# Patient Record
Sex: MALE | Race: WHITE | NOT HISPANIC OR LATINO | Employment: OTHER | URBAN - METROPOLITAN AREA
[De-identification: names, ages, dates, MRNs, and addresses within clinical notes are randomized per-mention and may not be internally consistent; named-entity substitution may affect disease eponyms.]

---

## 2017-05-18 ENCOUNTER — HOSPITAL ENCOUNTER (OUTPATIENT)
Dept: FAMILY MEDICINE CLINIC | Facility: CLINIC | Age: 62
Setting detail: SPECIMEN
Discharge: HOME | End: 2017-05-18
Attending: FAMILY MEDICINE | Admitting: FAMILY MEDICINE

## 2017-05-18 LAB
ALBUMIN SERPL-MCNC: 4.3 G/DL (ref 3.5–4.8)
ALBUMIN/GLOB SERPL: 1.7 {RATIO} (ref 1–1.7)
ALP SERPL-CCNC: 48 IU/L (ref 32–91)
ALT SERPL-CCNC: 29 IU/L (ref 17–63)
ANION GAP SERPL CALC-SCNC: 12.2 MMOL/L (ref 10–20)
AST SERPL-CCNC: 26 IU/L (ref 15–41)
BILIRUB SERPL-MCNC: 0.5 MG/DL (ref 0.3–1.2)
BUN SERPL-MCNC: 12 MG/DL (ref 8–20)
BUN/CREAT SERPL: 10.9 (ref 6.2–20.3)
CALCIUM SERPL-MCNC: 9.5 MG/DL (ref 8.9–10.3)
CHLORIDE SERPL-SCNC: 105 MMOL/L (ref 101–111)
CHOLEST SERPL-MCNC: 149 MG/DL
CHOLEST/HDLC SERPL: 5.2 {RATIO}
CONV CO2: 25 MMOL/L (ref 22–32)
CONV LDL CHOLESTEROL DIRECT: 98 MG/DL (ref 0–100)
CONV TOTAL PROTEIN: 6.8 G/DL (ref 6.1–7.9)
CREAT UR-MCNC: 1.1 MG/DL (ref 0.7–1.2)
GLOBULIN UR ELPH-MCNC: 2.5 G/DL (ref 2.5–3.8)
GLUCOSE SERPL-MCNC: 95 MG/DL (ref 65–99)
HDLC SERPL-MCNC: 29 MG/DL
LDLC/HDLC SERPL: 3.4 {RATIO}
LIPID INTERPRETATION: ABNORMAL
POTASSIUM SERPL-SCNC: 4.2 MMOL/L (ref 3.6–5.1)
SODIUM SERPL-SCNC: 138 MMOL/L (ref 136–144)
TRIGL SERPL-MCNC: 153 MG/DL
VLDLC SERPL CALC-MCNC: 22.6 MG/DL

## 2017-11-14 ENCOUNTER — HOSPITAL ENCOUNTER (OUTPATIENT)
Dept: FAMILY MEDICINE CLINIC | Facility: CLINIC | Age: 62
Setting detail: SPECIMEN
Discharge: HOME | End: 2017-11-14
Attending: FAMILY MEDICINE | Admitting: FAMILY MEDICINE

## 2017-11-14 LAB
ALBUMIN SERPL-MCNC: 4.1 G/DL (ref 3.5–4.8)
ALBUMIN/GLOB SERPL: 1.7 {RATIO} (ref 1–1.7)
ALP SERPL-CCNC: 47 IU/L (ref 32–91)
ALT SERPL-CCNC: 35 IU/L (ref 17–63)
ANION GAP SERPL CALC-SCNC: 9.4 MMOL/L (ref 10–20)
AST SERPL-CCNC: 28 IU/L (ref 15–41)
BASOPHILS # BLD AUTO: 0 10*3/UL (ref 0–0.2)
BASOPHILS NFR BLD AUTO: 1 % (ref 0–2)
BILIRUB SERPL-MCNC: 0.5 MG/DL (ref 0.3–1.2)
BILIRUB UR QL STRIP: NEGATIVE MG/DL
BUN SERPL-MCNC: 13 MG/DL (ref 8–20)
BUN/CREAT SERPL: 10 (ref 6.2–20.3)
CALCIUM SERPL-MCNC: 9.3 MG/DL (ref 8.9–10.3)
CASTS URNS QL MICRO: ABNORMAL /[LPF]
CHLORIDE SERPL-SCNC: 103 MMOL/L (ref 101–111)
CHOLEST SERPL-MCNC: 157 MG/DL
CHOLEST/HDLC SERPL: 6.7 {RATIO}
COLOR UR: YELLOW
CONV BACTERIA IN URINE MICRO: NEGATIVE
CONV CLARITY OF URINE: CLEAR
CONV CO2: 26 MMOL/L (ref 22–32)
CONV HYALINE CASTS IN URINE MICRO: 1 /[LPF] (ref 0–5)
CONV LDL CHOLESTEROL DIRECT: 96 MG/DL (ref 0–100)
CONV PROTEIN IN URINE BY AUTOMATED TEST STRIP: NEGATIVE MG/DL
CONV SMALL ROUND CELLS: ABNORMAL /[HPF]
CONV TOTAL PROTEIN: 6.5 G/DL (ref 6.1–7.9)
CONV UROBILINOGEN IN URINE BY AUTOMATED TEST STRIP: 0.2 MG/DL
CREAT UR-MCNC: 1.3 MG/DL (ref 0.7–1.2)
CULTURE INDICATED?: ABNORMAL
DIFFERENTIAL METHOD BLD: (no result)
EOSINOPHIL # BLD AUTO: 0.2 10*3/UL (ref 0–0.3)
EOSINOPHIL # BLD AUTO: 3 % (ref 0–3)
ERYTHROCYTE [DISTWIDTH] IN BLOOD BY AUTOMATED COUNT: 13 % (ref 11.5–14.5)
GLOBULIN UR ELPH-MCNC: 2.4 G/DL (ref 2.5–3.8)
GLUCOSE SERPL-MCNC: 96 MG/DL (ref 65–99)
GLUCOSE UR QL: NEGATIVE MG/DL
HCT VFR BLD AUTO: 45.2 % (ref 40–54)
HDLC SERPL-MCNC: 24 MG/DL
HGB BLD-MCNC: 15.2 G/DL (ref 14–18)
HGB UR QL STRIP: NEGATIVE
KETONES UR QL STRIP: NEGATIVE MG/DL
LDLC/HDLC SERPL: 4.1 {RATIO}
LEUKOCYTE ESTERASE UR QL STRIP: ABNORMAL
LIPID INTERPRETATION: ABNORMAL
LYMPHOCYTES # BLD AUTO: 1.7 10*3/UL (ref 0.8–4.8)
LYMPHOCYTES NFR BLD AUTO: 29 % (ref 18–42)
MCH RBC QN AUTO: 31.4 PG (ref 26–32)
MCHC RBC AUTO-ENTMCNC: 33.6 G/DL (ref 32–36)
MCV RBC AUTO: 93.7 FL (ref 80–94)
MONOCYTES # BLD AUTO: 0.5 10*3/UL (ref 0.1–1.3)
MONOCYTES NFR BLD AUTO: 8 % (ref 2–11)
NEUTROPHILS # BLD AUTO: 3.4 10*3/UL (ref 2.3–8.6)
NEUTROPHILS NFR BLD AUTO: 59 % (ref 50–75)
NITRITE UR QL STRIP: NEGATIVE
NRBC BLD AUTO-RTO: 0 /100{WBCS}
NRBC/RBC NFR BLD MANUAL: 0 10*3/UL
PH UR STRIP.AUTO: 6 [PH] (ref 4.5–8)
PLATELET # BLD AUTO: 172 10*3/UL (ref 150–450)
PMV BLD AUTO: 7.9 FL (ref 7.4–10.4)
POTASSIUM SERPL-SCNC: 4.4 MMOL/L (ref 3.6–5.1)
PSA SERPL-MCNC: 1.86 NG/ML (ref 0–4)
RBC # BLD AUTO: 4.82 10*6/UL (ref 4.6–6)
RBC #/AREA URNS HPF: 0 /[HPF] (ref 0–3)
SODIUM SERPL-SCNC: 134 MMOL/L (ref 136–144)
SP GR UR: 1.01 (ref 1–1.03)
SPERM URNS QL MICRO: ABNORMAL /[HPF]
SQUAMOUS SPT QL MICRO: 1 /[HPF] (ref 0–5)
TRIGL SERPL-MCNC: 195 MG/DL
TSH SERPL-ACNC: 4.08 UIU/ML (ref 0.34–5.6)
UNIDENT CRYS URNS QL MICRO: ABNORMAL /[HPF]
VLDLC SERPL CALC-MCNC: 37.7 MG/DL
WBC # BLD AUTO: 5.7 10*3/UL (ref 4.5–11.5)
WBC #/AREA URNS HPF: 5 /[HPF] (ref 0–5)
YEAST SPEC QL WET PREP: ABNORMAL /[HPF]

## 2017-11-15 LAB
HCV AB SER DONR QL: NORMAL
HCV AB SER DONR QL: NORMAL

## 2018-05-14 ENCOUNTER — HOSPITAL ENCOUNTER (OUTPATIENT)
Dept: FAMILY MEDICINE CLINIC | Facility: CLINIC | Age: 63
Setting detail: SPECIMEN
Discharge: HOME | End: 2018-05-14
Attending: FAMILY MEDICINE | Admitting: FAMILY MEDICINE

## 2018-05-14 LAB
ALBUMIN SERPL-MCNC: 4 G/DL (ref 3.5–4.8)
ALBUMIN/GLOB SERPL: 1.6 {RATIO} (ref 1–1.7)
ALP SERPL-CCNC: 49 IU/L (ref 32–91)
ALT SERPL-CCNC: 37 IU/L (ref 17–63)
ANION GAP SERPL CALC-SCNC: 12.6 MMOL/L (ref 10–20)
AST SERPL-CCNC: 28 IU/L (ref 15–41)
BILIRUB SERPL-MCNC: 0.5 MG/DL (ref 0.3–1.2)
BUN SERPL-MCNC: 17 MG/DL (ref 8–20)
BUN/CREAT SERPL: 14.2 (ref 6.2–20.3)
CALCIUM SERPL-MCNC: 9.9 MG/DL (ref 8.9–10.3)
CHLORIDE SERPL-SCNC: 104 MMOL/L (ref 101–111)
CHOLEST SERPL-MCNC: 168 MG/DL
CHOLEST/HDLC SERPL: 7.4 {RATIO}
CONV CO2: 27 MMOL/L (ref 22–32)
CONV LDL CHOLESTEROL DIRECT: 99 MG/DL (ref 0–100)
CONV TOTAL PROTEIN: 6.5 G/DL (ref 6.1–7.9)
CREAT UR-MCNC: 1.2 MG/DL (ref 0.7–1.2)
GLOBULIN UR ELPH-MCNC: 2.5 G/DL (ref 2.5–3.8)
GLUCOSE SERPL-MCNC: 99 MG/DL (ref 65–99)
HDLC SERPL-MCNC: 23 MG/DL
LDLC/HDLC SERPL: 4.4 {RATIO}
LIPID INTERPRETATION: ABNORMAL
POTASSIUM SERPL-SCNC: 4.6 MMOL/L (ref 3.6–5.1)
SODIUM SERPL-SCNC: 139 MMOL/L (ref 136–144)
TRIGL SERPL-MCNC: 248 MG/DL
VLDLC SERPL CALC-MCNC: 46.1 MG/DL

## 2018-07-02 ENCOUNTER — OFFICE (AMBULATORY)
Dept: URBAN - METROPOLITAN AREA PATHOLOGY 4 | Facility: PATHOLOGY | Age: 63
End: 2018-07-02

## 2018-07-02 ENCOUNTER — ON CAMPUS - OUTPATIENT (AMBULATORY)
Dept: URBAN - METROPOLITAN AREA HOSPITAL 2 | Facility: HOSPITAL | Age: 63
End: 2018-07-02

## 2018-07-02 VITALS
SYSTOLIC BLOOD PRESSURE: 100 MMHG | DIASTOLIC BLOOD PRESSURE: 72 MMHG | SYSTOLIC BLOOD PRESSURE: 129 MMHG | SYSTOLIC BLOOD PRESSURE: 119 MMHG | SYSTOLIC BLOOD PRESSURE: 101 MMHG | WEIGHT: 200 LBS | HEART RATE: 69 BPM | TEMPERATURE: 97.8 F | OXYGEN SATURATION: 94 % | DIASTOLIC BLOOD PRESSURE: 86 MMHG | HEIGHT: 71 IN | RESPIRATION RATE: 17 BRPM | RESPIRATION RATE: 16 BRPM | HEART RATE: 73 BPM | DIASTOLIC BLOOD PRESSURE: 58 MMHG | SYSTOLIC BLOOD PRESSURE: 132 MMHG | DIASTOLIC BLOOD PRESSURE: 83 MMHG | OXYGEN SATURATION: 97 % | DIASTOLIC BLOOD PRESSURE: 57 MMHG | DIASTOLIC BLOOD PRESSURE: 96 MMHG | DIASTOLIC BLOOD PRESSURE: 74 MMHG | RESPIRATION RATE: 18 BRPM | HEART RATE: 67 BPM | SYSTOLIC BLOOD PRESSURE: 120 MMHG | OXYGEN SATURATION: 96 % | HEART RATE: 65 BPM | OXYGEN SATURATION: 95 % | HEART RATE: 68 BPM

## 2018-07-02 DIAGNOSIS — Z86.010 PERSONAL HISTORY OF COLONIC POLYPS: ICD-10-CM

## 2018-07-02 DIAGNOSIS — D12.0 BENIGN NEOPLASM OF CECUM: ICD-10-CM

## 2018-07-02 DIAGNOSIS — K63.5 POLYP OF COLON: ICD-10-CM

## 2018-07-02 PROBLEM — D12.2 BENIGN NEOPLASM OF ASCENDING COLON: Status: ACTIVE | Noted: 2018-07-02

## 2018-07-02 LAB
GI HISTOLOGY: A. UNSPECIFIED: (no result)
GI HISTOLOGY: B. UNSPECIFIED: (no result)
GI HISTOLOGY: PDF REPORT: (no result)

## 2018-07-02 PROCEDURE — 88305 TISSUE EXAM BY PATHOLOGIST: CPT | Mod: 33 | Performed by: INTERNAL MEDICINE

## 2018-07-02 PROCEDURE — 45380 COLONOSCOPY AND BIOPSY: CPT | Mod: 33 | Performed by: INTERNAL MEDICINE

## 2018-07-02 RX ADMIN — PROPOFOL: 10 INJECTION, EMULSION INTRAVENOUS at 09:37

## 2019-05-08 ENCOUNTER — HOSPITAL ENCOUNTER (OUTPATIENT)
Dept: FAMILY MEDICINE CLINIC | Facility: CLINIC | Age: 64
Setting detail: SPECIMEN
Discharge: HOME | End: 2019-05-08
Attending: FAMILY MEDICINE | Admitting: FAMILY MEDICINE

## 2019-05-08 LAB
ALBUMIN SERPL-MCNC: 4 G/DL (ref 3.5–4.8)
ALBUMIN/GLOB SERPL: 1.6 {RATIO} (ref 1–1.7)
ALP SERPL-CCNC: 49 IU/L (ref 32–91)
ALT SERPL-CCNC: 24 IU/L (ref 17–63)
ANION GAP SERPL CALC-SCNC: 12.6 MMOL/L (ref 10–20)
AST SERPL-CCNC: 21 IU/L (ref 15–41)
BASOPHILS # BLD AUTO: 0 10*3/UL (ref 0–0.2)
BASOPHILS NFR BLD AUTO: 1 % (ref 0–2)
BILIRUB SERPL-MCNC: 0.6 MG/DL (ref 0.3–1.2)
BUN SERPL-MCNC: 15 MG/DL (ref 8–20)
BUN/CREAT SERPL: 12.5 (ref 6.2–20.3)
CALCIUM SERPL-MCNC: 9.2 MG/DL (ref 8.9–10.3)
CHLORIDE SERPL-SCNC: 103 MMOL/L (ref 101–111)
CHOLEST SERPL-MCNC: 148 MG/DL
CHOLEST/HDLC SERPL: 5.8 {RATIO}
CONV CO2: 24 MMOL/L (ref 22–32)
CONV LDL CHOLESTEROL DIRECT: 105 MG/DL (ref 0–100)
CONV TOTAL PROTEIN: 6.5 G/DL (ref 6.1–7.9)
CREAT UR-MCNC: 1.2 MG/DL (ref 0.7–1.2)
DIFFERENTIAL METHOD BLD: (no result)
EOSINOPHIL # BLD AUTO: 0.1 10*3/UL (ref 0–0.3)
EOSINOPHIL # BLD AUTO: 1 % (ref 0–3)
ERYTHROCYTE [DISTWIDTH] IN BLOOD BY AUTOMATED COUNT: 13.5 % (ref 11.5–14.5)
GLOBULIN UR ELPH-MCNC: 2.5 G/DL (ref 2.5–3.8)
GLUCOSE SERPL-MCNC: 100 MG/DL (ref 65–99)
HCT VFR BLD AUTO: 44.7 % (ref 40–54)
HDLC SERPL-MCNC: 26 MG/DL
HGB BLD-MCNC: 14.8 G/DL (ref 14–18)
LDLC/HDLC SERPL: 4.1 {RATIO}
LIPID INTERPRETATION: ABNORMAL
LYMPHOCYTES # BLD AUTO: 1.4 10*3/UL (ref 0.8–4.8)
LYMPHOCYTES NFR BLD AUTO: 27 % (ref 18–42)
MCH RBC QN AUTO: 30.8 PG (ref 26–32)
MCHC RBC AUTO-ENTMCNC: 33 G/DL (ref 32–36)
MCV RBC AUTO: 93.3 FL (ref 80–94)
MONOCYTES # BLD AUTO: 0.4 10*3/UL (ref 0.1–1.3)
MONOCYTES NFR BLD AUTO: 8 % (ref 2–11)
NEUTROPHILS # BLD AUTO: 3.2 10*3/UL (ref 2.3–8.6)
NEUTROPHILS NFR BLD AUTO: 63 % (ref 50–75)
NRBC BLD AUTO-RTO: 0 /100{WBCS}
NRBC/RBC NFR BLD MANUAL: 0 10*3/UL
PLATELET # BLD AUTO: 185 10*3/UL (ref 150–450)
PMV BLD AUTO: 7.9 FL (ref 7.4–10.4)
POTASSIUM SERPL-SCNC: 4.6 MMOL/L (ref 3.6–5.1)
PSA SERPL-MCNC: 1.93 NG/ML (ref 0–4)
RBC # BLD AUTO: 4.79 10*6/UL (ref 4.6–6)
SODIUM SERPL-SCNC: 135 MMOL/L (ref 136–144)
TESTOST SERPL-MCNC: 2.97 NG/ML (ref 1.7–7.8)
TRIGL SERPL-MCNC: 123 MG/DL
TSH SERPL-ACNC: 3.22 UIU/ML (ref 0.34–5.6)
VLDLC SERPL CALC-MCNC: 17.5 MG/DL
WBC # BLD AUTO: 5.1 10*3/UL (ref 4.5–11.5)

## 2019-06-17 RX ORDER — FENOFIBRATE 160 MG/1
TABLET ORAL
Qty: 90 TABLET | Refills: 1 | Status: SHIPPED | OUTPATIENT
Start: 2019-06-17 | End: 2020-01-02

## 2019-07-29 RX ORDER — CYCLOBENZAPRINE HCL 10 MG
TABLET ORAL
Qty: 30 TABLET | Refills: 0 | Status: SHIPPED | OUTPATIENT
Start: 2019-07-29 | End: 2020-08-29

## 2019-09-30 ENCOUNTER — TELEPHONE (OUTPATIENT)
Dept: FAMILY MEDICINE CLINIC | Facility: CLINIC | Age: 64
End: 2019-09-30

## 2019-09-30 RX ORDER — ALPRAZOLAM 1 MG/1
TABLET ORAL
COMMUNITY
Start: 2014-02-18 | End: 2019-10-08 | Stop reason: SDUPTHER

## 2019-09-30 RX ORDER — ALPRAZOLAM 1 MG/1
1 TABLET ORAL 2 TIMES DAILY
Qty: 180 TABLET | Refills: 0 | Status: CANCELLED | OUTPATIENT
Start: 2019-09-30

## 2019-10-08 ENCOUNTER — TELEPHONE (OUTPATIENT)
Dept: FAMILY MEDICINE CLINIC | Facility: CLINIC | Age: 64
End: 2019-10-08

## 2019-10-08 RX ORDER — ALPRAZOLAM 1 MG/1
1 TABLET ORAL 2 TIMES DAILY PRN
Qty: 180 TABLET | Refills: 1 | Status: SHIPPED | OUTPATIENT
Start: 2019-10-08 | End: 2020-03-25 | Stop reason: SDUPTHER

## 2019-11-04 ENCOUNTER — OFFICE VISIT (OUTPATIENT)
Dept: FAMILY MEDICINE CLINIC | Facility: CLINIC | Age: 64
End: 2019-11-04

## 2019-11-04 VITALS
HEART RATE: 77 BPM | SYSTOLIC BLOOD PRESSURE: 118 MMHG | WEIGHT: 184.8 LBS | RESPIRATION RATE: 12 BRPM | DIASTOLIC BLOOD PRESSURE: 80 MMHG | BODY MASS INDEX: 25.77 KG/M2

## 2019-11-04 DIAGNOSIS — F41.9 ANXIETY: Primary | ICD-10-CM

## 2019-11-04 DIAGNOSIS — M15.9 PRIMARY OSTEOARTHRITIS INVOLVING MULTIPLE JOINTS: ICD-10-CM

## 2019-11-04 DIAGNOSIS — F51.01 PRIMARY INSOMNIA: ICD-10-CM

## 2019-11-04 DIAGNOSIS — Z23 FLU VACCINE NEED: ICD-10-CM

## 2019-11-04 PROBLEM — E78.5 HYPERLIPIDEMIA: Status: ACTIVE | Noted: 2019-11-04

## 2019-11-04 PROBLEM — K21.9 GASTROESOPHAGEAL REFLUX DISEASE: Status: ACTIVE | Noted: 2019-11-04

## 2019-11-04 PROCEDURE — 99213 OFFICE O/P EST LOW 20 MIN: CPT | Performed by: FAMILY MEDICINE

## 2019-11-04 PROCEDURE — 90471 IMMUNIZATION ADMIN: CPT | Performed by: FAMILY MEDICINE

## 2019-11-04 PROCEDURE — 90674 CCIIV4 VAC NO PRSV 0.5 ML IM: CPT | Performed by: FAMILY MEDICINE

## 2019-11-04 RX ORDER — ASPIRIN 81 MG/1
TABLET ORAL
COMMUNITY
Start: 2015-09-08

## 2019-11-04 RX ORDER — TRAZODONE HYDROCHLORIDE 150 MG/1
TABLET ORAL
COMMUNITY
Start: 2019-10-06 | End: 2020-01-02

## 2019-11-04 RX ORDER — DOCUSATE SODIUM 100 MG/1
1 CAPSULE, LIQUID FILLED ORAL EVERY 24 HOURS
COMMUNITY
Start: 2019-05-08

## 2019-11-04 RX ORDER — SIMVASTATIN 40 MG
TABLET ORAL
COMMUNITY
Start: 2019-08-09 | End: 2020-03-04

## 2019-11-04 RX ORDER — DICLOFENAC SODIUM 75 MG/1
TABLET, DELAYED RELEASE ORAL EVERY 12 HOURS
COMMUNITY
Start: 2017-04-06 | End: 2020-01-02

## 2019-11-04 RX ORDER — LANSOPRAZOLE 15 MG/1
15 CAPSULE, DELAYED RELEASE ORAL DAILY
COMMUNITY

## 2019-11-04 RX ORDER — TAMSULOSIN HYDROCHLORIDE 0.4 MG/1
CAPSULE ORAL
COMMUNITY
Start: 2019-09-15 | End: 2020-01-07 | Stop reason: SDUPTHER

## 2019-11-04 RX ORDER — TRIAMCINOLONE ACETONIDE 1 MG/G
CREAM TOPICAL
COMMUNITY
Start: 2017-05-18

## 2019-11-04 NOTE — PROGRESS NOTES
Rooming Tab(CC,VS,Pt Hx,Fall Screen)  Chief Complaint   Patient presents with   • Hypertension   • Hyperlipidemia       Subjective 64-year-old here for follow-up of his chronic insomnia and anxiety, he is doing well with trazodone taking nightly as well as Xanax.  He denies any depression or suicidal ideation.  He gets a lot of exercise by working out in his yard in his field and clearing out brush lines.  He denies any chest pain or dizziness or syncope with exertion.  He is doing well and feeling well overall.    I have reviewed and updated his medications, medical history and problem list during today's office visit.     Patient Care Team:  Dajuan Thomas MD as PCP - General  Provider, No Known as PCP - Family Medicine    Problem List Tab  Medications Tab  Synopsis Tab  Chart Review Tab  Care Everywhere Tab  Immunizations Tab  Patient History Tab    Social History     Tobacco Use   • Smoking status: Never Smoker   • Smokeless tobacco: Never Used   Substance Use Topics   • Alcohol use: Yes     Frequency: Monthly or less       Review of Systems   Constitutional: Negative for chills, fatigue and fever.   HENT: Negative for nosebleeds.    Eyes: Negative for double vision.   Respiratory: Negative for chest tightness and shortness of breath.    Cardiovascular: Negative for chest pain and palpitations.   Gastrointestinal: Negative for blood in stool.   Genitourinary: Negative for dysuria and hematuria.   Neurological: Negative for dizziness and syncope.   Psychiatric/Behavioral: Positive for stress. Negative for depressed mood. The patient is not nervous/anxious.        Objective     Rooming Tab(CC,VS,Pt Hx,Fall Screen)  /80 (BP Location: Right arm, Patient Position: Sitting, Cuff Size: Large Adult)   Pulse 77   Resp 12   Wt 83.8 kg (184 lb 12.8 oz)   BMI 25.77 kg/m²     Body mass index is 25.77 kg/m².    Physical Exam   Constitutional: He is oriented to person, place, and time. He appears well-developed  and well-nourished. No distress.   HENT:   Head: Normocephalic and atraumatic.   Nose: Nose normal.   Mouth/Throat: Oropharynx is clear and moist.   Eyes: Conjunctivae, EOM and lids are normal. Pupils are equal, round, and reactive to light.   Neck: Trachea normal and normal range of motion. Neck supple. No JVD present. Carotid bruit is not present. No thyroid mass and no thyromegaly present.   No carotid bruits   Cardiovascular: Normal rate, regular rhythm, normal heart sounds and intact distal pulses.   Pulmonary/Chest: Effort normal and breath sounds normal.   Musculoskeletal:   No c/c/e   Neurological: He is alert and oriented to person, place, and time. No cranial nerve deficit.   Skin: Skin is warm and dry.   Psychiatric: He has a normal mood and affect. His speech is normal and behavior is normal. He is attentive.   Nursing note and vitals reviewed.       Statin Choice Calculator  Data Reviewed:                   Assessment/Plan   Order Review Tab  Health Maintenance Tab  Patient Plan/Order Tab  Diagnoses and all orders for this visit:    1. Anxiety (Primary)  Assessment & Plan:  Stable on his current medical regimen      2. Primary osteoarthritis involving multiple joints  Assessment & Plan:  Stable      3. Primary insomnia  Assessment & Plan:  Stable      4. Flu vaccine need  -     Flucelvax Quad=>4Years ([id number])      Wrapup Tab  Return in about 6 months (around 5/4/2020) for Annual physical.

## 2019-11-04 NOTE — PATIENT INSTRUCTIONS
Please check with your insurance and get the new shingrix vaccine series to prevent shingles.    Exercising to Stay Healthy  To become healthy and stay healthy, it is recommended that you do moderate-intensity and vigorous-intensity exercise. You can tell that you are exercising at a moderate intensity if your heart starts beating faster and you start breathing faster but can still hold a conversation. You can tell that you are exercising at a vigorous intensity if you are breathing much harder and faster and cannot hold a conversation while exercising.  Exercising regularly is important. It has many health benefits, such as:  · Improving overall fitness, flexibility, and endurance.  · Increasing bone density.  · Helping with weight control.  · Decreasing body fat.  · Increasing muscle strength.  · Reducing stress and tension.  · Improving overall health.  How often should I exercise?  Choose an activity that you enjoy, and set realistic goals. Your health care provider can help you make an activity plan that works for you.  Exercise regularly as told by your health care provider. This may include:  · Doing strength training two times a week, such as:  ? Lifting weights.  ? Using resistance bands.  ? Push-ups.  ? Sit-ups.  ? Yoga.  · Doing a certain intensity of exercise for a given amount of time. Choose from these options:  ? A total of 150 minutes of moderate-intensity exercise every week.  ? A total of 75 minutes of vigorous-intensity exercise every week.  ? A mix of moderate-intensity and vigorous-intensity exercise every week.  Children, pregnant women, people who have not exercised regularly, people who are overweight, and older adults may need to talk with a health care provider about what activities are safe to do. If you have a medical condition, be sure to talk with your health care provider before you start a new exercise program.  What are some exercise ideas?  Moderate-intensity exercise ideas  include:  · Walking 1 mile (1.6 km) in about 15 minutes.  · Biking.  · Hiking.  · Golfing.  · Dancing.  · Water aerobics.  Vigorous-intensity exercise ideas include:  · Walking 4.5 miles (7.2 km) or more in about 1 hour.  · Jogging or running 5 miles (8 km) in about 1 hour.  · Biking 10 miles (16.1 km) or more in about 1 hour.  · Lap swimming.  · Roller-skating or in-line skating.  · Cross-country skiing.  · Vigorous competitive sports, such as football, basketball, and soccer.  · Jumping rope.  · Aerobic dancing.  What are some everyday activities that can help me to get exercise?  · Yard work, such as:  ? Pushing a .  ? Raking and bagging leaves.  · Washing your car.  · Pushing a stroller.  · Shoveling snow.  · Gardening.  · Washing windows or floors.  How can I be more active in my day-to-day activities?  · Use stairs instead of an elevator.  · Take a walk during your lunch break.  · If you drive, park your car farther away from your work or school.  · If you take public transportation, get off one stop early and walk the rest of the way.  · Stand up or walk around during all of your indoor phone calls.  · Get up, stretch, and walk around every 30 minutes throughout the day.  · Enjoy exercise with a friend. Support to continue exercising will help you keep a regular routine of activity.  What guidelines can I follow while exercising?  · Before you start a new exercise program, talk with your health care provider.  · Do not exercise so much that you hurt yourself, feel dizzy, or get very short of breath.  · Wear comfortable clothes and wear shoes with good support.  · Drink plenty of water while you exercise to prevent dehydration or heat stroke.  · Work out until your breathing and your heartbeat get faster.  Where to find more information  · [place] Department of Health and Human Services: www.hhs.gov  · Centers for Disease Control and Prevention (CDC): www.cdc.gov  Summary  · Exercising regularly is  important. It will improve your overall fitness, flexibility, and endurance.  · Regular exercise also will improve your overall health. It can help you control your weight, reduce stress, and improve your bone density.  · Do not exercise so much that you hurt yourself, feel dizzy, or get very short of breath.  · Before you start a new exercise program, talk with your health care provider.  This information is not intended to replace advice given to you by your health care provider. Make sure you discuss any questions you have with your health care provider.  Document Released: 01/20/2012 Document Revised: 11/08/2018 Document Reviewed: 11/08/2018  [employer] Interactive Patient Education © 2019 [employer]

## 2020-01-02 RX ORDER — TRAZODONE HYDROCHLORIDE 150 MG/1
TABLET ORAL
Qty: 90 TABLET | Refills: 4 | Status: SHIPPED | OUTPATIENT
Start: 2020-01-02 | End: 2021-01-26

## 2020-01-02 RX ORDER — FENOFIBRATE 160 MG/1
TABLET ORAL
Qty: 90 TABLET | Refills: 4 | Status: SHIPPED | OUTPATIENT
Start: 2020-01-02 | End: 2021-03-02 | Stop reason: SDUPTHER

## 2020-01-02 RX ORDER — DICLOFENAC SODIUM 75 MG/1
TABLET, DELAYED RELEASE ORAL
Qty: 180 TABLET | Refills: 4 | Status: SHIPPED | OUTPATIENT
Start: 2020-01-02 | End: 2022-02-23 | Stop reason: SDUPTHER

## 2020-01-07 ENCOUNTER — TELEPHONE (OUTPATIENT)
Dept: FAMILY MEDICINE CLINIC | Facility: CLINIC | Age: 65
End: 2020-01-07

## 2020-01-07 RX ORDER — TAMSULOSIN HYDROCHLORIDE 0.4 MG/1
1 CAPSULE ORAL DAILY
Qty: 90 CAPSULE | Refills: 1 | Status: SHIPPED | OUTPATIENT
Start: 2020-01-07 | End: 2020-07-27

## 2020-01-07 NOTE — TELEPHONE ENCOUNTER
Patient left a voice message that he wants an rx sent to [facility] for generic Flomax 0.4mg once daily - 90 day supply with 3 refills.  He was seeing a specialist who was prescribing this, but wants you to prescribe now.

## 2020-03-04 RX ORDER — SIMVASTATIN 40 MG
TABLET ORAL
Qty: 90 TABLET | Refills: 4 | Status: SHIPPED | OUTPATIENT
Start: 2020-03-04 | End: 2021-06-22 | Stop reason: SDUPTHER

## 2020-03-25 ENCOUNTER — OFFICE VISIT (OUTPATIENT)
Dept: FAMILY MEDICINE CLINIC | Facility: CLINIC | Age: 65
End: 2020-03-25

## 2020-03-25 VITALS
SYSTOLIC BLOOD PRESSURE: 122 MMHG | DIASTOLIC BLOOD PRESSURE: 78 MMHG | HEART RATE: 91 BPM | WEIGHT: 181.8 LBS | RESPIRATION RATE: 12 BRPM | BODY MASS INDEX: 25.36 KG/M2

## 2020-03-25 DIAGNOSIS — M70.62 TROCHANTERIC BURSITIS OF LEFT HIP: Primary | ICD-10-CM

## 2020-03-25 DIAGNOSIS — I83.91 ASYMPTOMATIC VARICOSE VEINS OF RIGHT LOWER EXTREMITY: ICD-10-CM

## 2020-03-25 DIAGNOSIS — F41.9 ANXIETY: ICD-10-CM

## 2020-03-25 PROCEDURE — 20610 DRAIN/INJ JOINT/BURSA W/O US: CPT | Performed by: FAMILY MEDICINE

## 2020-03-25 PROCEDURE — 99213 OFFICE O/P EST LOW 20 MIN: CPT | Performed by: FAMILY MEDICINE

## 2020-03-25 RX ORDER — ALPRAZOLAM 1 MG/1
1 TABLET ORAL 3 TIMES DAILY PRN
Qty: 270 TABLET | Refills: 1 | Status: SHIPPED | OUTPATIENT
Start: 2020-03-25 | End: 2020-09-23

## 2020-03-25 RX ORDER — TRIAMCINOLONE ACETONIDE 40 MG/ML
40 INJECTION, SUSPENSION INTRA-ARTICULAR; INTRAMUSCULAR
Status: COMPLETED | OUTPATIENT
Start: 2020-03-25 | End: 2020-03-25

## 2020-03-25 RX ADMIN — TRIAMCINOLONE ACETONIDE 40 MG: 40 INJECTION, SUSPENSION INTRA-ARTICULAR; INTRAMUSCULAR at 14:03

## 2020-03-25 NOTE — ASSESSMENT & PLAN NOTE
Options discussed with the patient he wants an injection.  Under sterile conditions, cleaned area with Betadine, injected 1 2 cc of lidocaine 2% and 1 cc of Kenalog.  Patient tolerated procedure without apparent complication.

## 2020-03-25 NOTE — ASSESSMENT & PLAN NOTE
Worsened recently due to situational issues.  I recommend stress relief with exercise and getting out doing things he enjoys doing.  I went ahead and increase his Xanax to 1 mg 3 times daily as needed but told him we needed to be very careful using this much of the medication as it is habit forming.
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
## 2020-03-25 NOTE — ASSESSMENT & PLAN NOTE
I told the patient since his varicose vein is more prominent, he should wear compression socks knee-high 15 to 20 mmHg.  If it continues to bother him or cause him issues we could have vascular surgery see and he will call me for this.

## 2020-04-30 ENCOUNTER — TELEPHONE (OUTPATIENT)
Dept: FAMILY MEDICINE CLINIC | Facility: CLINIC | Age: 65
End: 2020-04-30

## 2020-05-01 DIAGNOSIS — E78.5 HYPERLIPIDEMIA, UNSPECIFIED HYPERLIPIDEMIA TYPE: Primary | ICD-10-CM

## 2020-05-15 NOTE — TELEPHONE ENCOUNTER
Patient is calling to request an RX for the following:    Chlorcyclizine-Pseudoephed (STAHIST AD) 25-60 MG tablet    [facility] [address] [place] [address] confirmed    Patient call back [phone]

## 2020-06-09 ENCOUNTER — LAB (OUTPATIENT)
Dept: FAMILY MEDICINE CLINIC | Facility: CLINIC | Age: 65
End: 2020-06-09

## 2020-06-09 DIAGNOSIS — E78.5 HYPERLIPIDEMIA, UNSPECIFIED HYPERLIPIDEMIA TYPE: ICD-10-CM

## 2020-06-09 LAB
ALBUMIN SERPL-MCNC: 4.4 G/DL (ref 3.5–5.2)
ALBUMIN/GLOB SERPL: 2 G/DL
ALP SERPL-CCNC: 41 U/L (ref 39–117)
ALT SERPL W P-5'-P-CCNC: 18 U/L (ref 1–41)
ANION GAP SERPL CALCULATED.3IONS-SCNC: 12.2 MMOL/L (ref 5–15)
AST SERPL-CCNC: 18 U/L (ref 1–40)
BILIRUB SERPL-MCNC: 0.6 MG/DL (ref 0.2–1.2)
BUN BLD-MCNC: 19 MG/DL (ref 8–23)
BUN/CREAT SERPL: 17.6 (ref 7–25)
CALCIUM SPEC-SCNC: 9.9 MG/DL (ref 8.6–10.5)
CHLORIDE SERPL-SCNC: 104 MMOL/L (ref 98–107)
CHOLEST SERPL-MCNC: 139 MG/DL (ref 0–200)
CO2 SERPL-SCNC: 24.8 MMOL/L (ref 22–29)
CREAT BLD-MCNC: 1.08 MG/DL (ref 0.76–1.27)
GFR SERPL CREATININE-BSD FRML MDRD: 69 ML/MIN/1.73
GLOBULIN UR ELPH-MCNC: 2.2 GM/DL
GLUCOSE BLD-MCNC: 109 MG/DL (ref 65–99)
HDLC SERPL-MCNC: 34 MG/DL (ref 40–60)
LDLC SERPL CALC-MCNC: 81 MG/DL (ref 0–100)
LDLC/HDLC SERPL: 2.38 {RATIO}
POTASSIUM BLD-SCNC: 4 MMOL/L (ref 3.5–5.2)
PROT SERPL-MCNC: 6.6 G/DL (ref 6–8.5)
SODIUM BLD-SCNC: 141 MMOL/L (ref 136–145)
TRIGL SERPL-MCNC: 120 MG/DL (ref 0–150)
VLDLC SERPL-MCNC: 24 MG/DL (ref 5–40)

## 2020-06-09 PROCEDURE — 36415 COLL VENOUS BLD VENIPUNCTURE: CPT

## 2020-06-09 PROCEDURE — 80053 COMPREHEN METABOLIC PANEL: CPT | Performed by: FAMILY MEDICINE

## 2020-06-09 PROCEDURE — 80061 LIPID PANEL: CPT | Performed by: FAMILY MEDICINE

## 2020-06-15 ENCOUNTER — TELEPHONE (OUTPATIENT)
Dept: FAMILY MEDICINE CLINIC | Facility: CLINIC | Age: 65
End: 2020-06-15

## 2020-06-15 ENCOUNTER — OFFICE VISIT (OUTPATIENT)
Dept: FAMILY MEDICINE CLINIC | Facility: CLINIC | Age: 65
End: 2020-06-15

## 2020-06-15 DIAGNOSIS — E78.2 MIXED HYPERLIPIDEMIA: Primary | ICD-10-CM

## 2020-06-15 DIAGNOSIS — R73.9 HYPERGLYCEMIA: ICD-10-CM

## 2020-06-15 PROCEDURE — 99442 PR PHYS/QHP TELEPHONE EVALUATION 11-20 MIN: CPT | Performed by: FAMILY MEDICINE

## 2020-06-15 NOTE — TELEPHONE ENCOUNTER
PATIENT REQUESTED TO HAVE HIS APPOINTMENT THAT HE HAS FOR TODAY AT 1PM TO BE CHANGED TO A TELEPHONE VISIT.     [facility] CALL BACK NUMBER   [phone]

## 2020-06-15 NOTE — PROGRESS NOTES
Rooming Tab(CC,VS,Pt Hx,Fall Screen)  Chief Complaint   Patient presents with   • Hyperlipidemia       Subjective 64-year-old here on a telephone visit secondary to COVID-19.  He wanted to go over his cholesterol.  He has been working on diet and exercise and weight loss.  He is also on simvastatin 40 mg daily and tolerating the medication without difficulty.  His total cholesterol was 139, his HDL was 34 with an LDL of 81.  Her more aerobic exercise will continue to improve his HDL, his HDL improved from 26-34 with what he is doing already.  His glucose was 109 with a normal electrolytes kidney function and liver function testing.  He is feeling good and feels less anxious now is on vacation and has takes Xanax as needed.  He denies any chest pain dizziness or syncope.    I have reviewed and updated his medications, medical history and problem list during today's office visit.     Patient Care Team:  Dajuan Thomas MD as PCP - General    Problem List Tab  Medications Tab  Synopsis Tab  Chart Review Tab  Care Everywhere Tab  Immunizations Tab  Patient History Tab    Social History     Tobacco Use   • Smoking status: Never Smoker   • Smokeless tobacco: Never Used   Substance Use Topics   • Alcohol use: Yes     Frequency: Monthly or less       Review of Systems   Constitutional: Negative for chills, fatigue and fever.   HENT: Negative for nosebleeds.    Eyes: Negative for double vision.   Respiratory: Negative for chest tightness and shortness of breath.    Cardiovascular: Negative for chest pain and palpitations.   Gastrointestinal: Negative for blood in stool.   Genitourinary: Negative for dysuria and hematuria.   Neurological: Negative for dizziness and syncope.   Psychiatric/Behavioral: Negative for depressed mood.       Objective     Rooming Tab(CC,VS,Pt Hx,Fall Screen)  There were no vitals taken for this visit.    There is no height or weight on file to calculate BMI.    Physical Exam     Statin Choice  Calculator  Data Reviewed:               Lab Results   Component Value Date    BUN 19 06/09/2020    CREATININE 1.08 06/09/2020    EGFRIFNONA 69 06/09/2020     06/09/2020    K 4.0 06/09/2020     06/09/2020    CALCIUM 9.9 06/09/2020    ALBUMIN 4.40 06/09/2020    BILITOT 0.6 06/09/2020    ALKPHOS 41 06/09/2020    AST 18 06/09/2020    ALT 18 06/09/2020    TRIG 120 06/09/2020    HDL 34 (L) 06/09/2020    VLDL 24 06/09/2020    LDL 81 06/09/2020    LDLHDL 2.38 06/09/2020      Assessment/Plan   Order Review Tab  Health Maintenance Tab  Patient Plan/Order Tab  Diagnoses and all orders for this visit:    1. Mixed hyperlipidemia (Primary)  Assessment & Plan:  Lipid abnormalities are improving with treatment.  Pharmacotherapy as ordered.  Lipids will be reassessed in 6 months.      2. Hyperglycemia  Assessment & Plan:  Continue low-carb diet and will repeat at next visit        Wrapup Tab  Return in about 6 months (around 12/15/2020) for Annual physical.       Total time telephone visit 11 minutes

## 2020-06-16 PROBLEM — R73.9 HYPERGLYCEMIA: Status: ACTIVE | Noted: 2020-06-16

## 2020-07-27 RX ORDER — TAMSULOSIN HYDROCHLORIDE 0.4 MG/1
CAPSULE ORAL
Qty: 90 CAPSULE | Refills: 3 | Status: SHIPPED | OUTPATIENT
Start: 2020-07-27 | End: 2021-07-15 | Stop reason: SDUPTHER

## 2020-08-29 ENCOUNTER — APPOINTMENT (OUTPATIENT)
Dept: CT IMAGING | Facility: HOSPITAL | Age: 65
End: 2020-08-29

## 2020-08-29 ENCOUNTER — HOSPITAL ENCOUNTER (EMERGENCY)
Facility: HOSPITAL | Age: 65
Discharge: HOME | End: 2020-08-29
Admitting: EMERGENCY MEDICINE

## 2020-08-29 VITALS
WEIGHT: 167 LBS | RESPIRATION RATE: 18 BRPM | OXYGEN SATURATION: 96 % | HEART RATE: 74 BPM | BODY MASS INDEX: 22.62 KG/M2 | SYSTOLIC BLOOD PRESSURE: 116 MMHG | DIASTOLIC BLOOD PRESSURE: 82 MMHG | HEIGHT: 72 IN | TEMPERATURE: 98.6 F

## 2020-08-29 DIAGNOSIS — M54.2 CERVICALGIA: ICD-10-CM

## 2020-08-29 DIAGNOSIS — R10.84 GENERALIZED ABDOMINAL PAIN: ICD-10-CM

## 2020-08-29 DIAGNOSIS — S20.219A CONTUSION OF CHEST WALL, UNSPECIFIED LATERALITY, INITIAL ENCOUNTER: ICD-10-CM

## 2020-08-29 DIAGNOSIS — S22.42XA CLOSED FRACTURE OF MULTIPLE RIBS OF LEFT SIDE, INITIAL ENCOUNTER: ICD-10-CM
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
## 2020-08-29 DIAGNOSIS — R31.1 BENIGN ESSENTIAL MICROSCOPIC HEMATURIA: ICD-10-CM

## 2020-08-29 LAB
ALBUMIN SERPL-MCNC: 4.4 G/DL (ref 3.5–5.2)
ALBUMIN/GLOB SERPL: 2 G/DL
ALP SERPL-CCNC: 49 U/L (ref 39–117)
ALT SERPL W P-5'-P-CCNC: 17 U/L (ref 1–41)
AMPHET+METHAMPHET UR QL: NEGATIVE
ANION GAP SERPL CALCULATED.3IONS-SCNC: 13 MMOL/L (ref 5–15)
AST SERPL-CCNC: 21 U/L (ref 1–40)
BACTERIA UR QL AUTO: ABNORMAL /HPF
BARBITURATES UR QL SCN: NEGATIVE
BASOPHILS # BLD AUTO: 0 10*3/MM3 (ref 0–0.2)
BASOPHILS NFR BLD AUTO: 0.4 % (ref 0–1.5)
BENZODIAZ UR QL SCN: NEGATIVE
BILIRUB SERPL-MCNC: 0.3 MG/DL (ref 0–1.2)
BILIRUB UR QL STRIP: NEGATIVE
BUN SERPL-MCNC: 14 MG/DL (ref 8–23)
BUN SERPL-MCNC: ABNORMAL MG/DL
BUN/CREAT SERPL: ABNORMAL
CALCIUM SPEC-SCNC: 9.3 MG/DL (ref 8.6–10.5)
CANNABINOIDS SERPL QL: POSITIVE
CHLORIDE SERPL-SCNC: 103 MMOL/L (ref 98–107)
CLARITY UR: CLEAR
CO2 SERPL-SCNC: 23 MMOL/L (ref 22–29)
COCAINE UR QL: NEGATIVE
COLOR UR: YELLOW
CREAT SERPL-MCNC: 1.22 MG/DL (ref 0.76–1.27)
DEPRECATED RDW RBC AUTO: 43.8 FL (ref 37–54)
EOSINOPHIL # BLD AUTO: 0.1 10*3/MM3 (ref 0–0.4)
EOSINOPHIL NFR BLD AUTO: 0.9 % (ref 0.3–6.2)
ERYTHROCYTE [DISTWIDTH] IN BLOOD BY AUTOMATED COUNT: 13.3 % (ref 12.3–15.4)
GFR SERPL CREATININE-BSD FRML MDRD: 60 ML/MIN/1.73
GLOBULIN UR ELPH-MCNC: 2.2 GM/DL
GLUCOSE SERPL-MCNC: 117 MG/DL (ref 65–99)
GLUCOSE UR STRIP-MCNC: NEGATIVE MG/DL
HCT VFR BLD AUTO: 43.1 % (ref 37.5–51)
HGB BLD-MCNC: 14.4 G/DL (ref 13–17.7)
HGB UR QL STRIP.AUTO: ABNORMAL
HOLD SPECIMEN: NORMAL
HYALINE CASTS UR QL AUTO: ABNORMAL /LPF
KETONES UR QL STRIP: NEGATIVE
LEUKOCYTE ESTERASE UR QL STRIP.AUTO: NEGATIVE
LYMPHOCYTES # BLD AUTO: 1.3 10*3/MM3 (ref 0.7–3.1)
LYMPHOCYTES NFR BLD AUTO: 18.3 % (ref 19.6–45.3)
MCH RBC QN AUTO: 31.7 PG (ref 26.6–33)
MCHC RBC AUTO-ENTMCNC: 33.4 G/DL (ref 31.5–35.7)
MCV RBC AUTO: 95.1 FL (ref 79–97)
METHADONE UR QL SCN: NEGATIVE
MONOCYTES # BLD AUTO: 0.5 10*3/MM3 (ref 0.1–0.9)
MONOCYTES NFR BLD AUTO: 6.4 % (ref 5–12)
NEUTROPHILS NFR BLD AUTO: 5.4 10*3/MM3 (ref 1.7–7)
NEUTROPHILS NFR BLD AUTO: 74 % (ref 42.7–76)
NITRITE UR QL STRIP: NEGATIVE
NRBC BLD AUTO-RTO: 0 /100 WBC (ref 0–0.2)
OPIATES UR QL: NEGATIVE
OXYCODONE UR QL SCN: NEGATIVE
PH UR STRIP.AUTO: 7 [PH] (ref 5–8)
PLATELET # BLD AUTO: 212 10*3/MM3 (ref 140–450)
PMV BLD AUTO: 7.5 FL (ref 6–12)
POTASSIUM SERPL-SCNC: 3.9 MMOL/L (ref 3.5–5.2)
PROT SERPL-MCNC: 6.6 G/DL (ref 6–8.5)
PROT UR QL STRIP: NEGATIVE
RBC # BLD AUTO: 4.53 10*6/MM3 (ref 4.14–5.8)
RBC # UR: ABNORMAL /HPF
REF LAB TEST METHOD: ABNORMAL
SODIUM SERPL-SCNC: 139 MMOL/L (ref 136–145)
SP GR UR STRIP: 1.01 (ref 1–1.03)
SQUAMOUS #/AREA URNS HPF: ABNORMAL /HPF
TROPONIN T SERPL-MCNC: <0.01 NG/ML (ref 0–0.03)
UROBILINOGEN UR QL STRIP: ABNORMAL
WBC # BLD AUTO: 7.3 10*3/MM3 (ref 3.4–10.8)
WBC UR QL AUTO: ABNORMAL /HPF
WHOLE BLOOD HOLD SPECIMEN: NORMAL

## 2020-08-29 PROCEDURE — 70450 CT HEAD/BRAIN W/O DYE: CPT

## 2020-08-29 PROCEDURE — 80307 DRUG TEST PRSMV CHEM ANLYZR: CPT | Performed by: NURSE PRACTITIONER

## 2020-08-29 PROCEDURE — 72125 CT NECK SPINE W/O DYE: CPT

## 2020-08-29 PROCEDURE — 85025 COMPLETE CBC W/AUTO DIFF WBC: CPT | Performed by: NURSE PRACTITIONER

## 2020-08-29 PROCEDURE — 81001 URINALYSIS AUTO W/SCOPE: CPT | Performed by: NURSE PRACTITIONER

## 2020-08-29 PROCEDURE — 71260 CT THORAX DX C+: CPT

## 2020-08-29 PROCEDURE — 74177 CT ABD & PELVIS W/CONTRAST: CPT

## 2020-08-29 PROCEDURE — 0 IOPAMIDOL PER 1 ML: Performed by: NURSE PRACTITIONER

## 2020-08-29 PROCEDURE — 80053 COMPREHEN METABOLIC PANEL: CPT | Performed by: NURSE PRACTITIONER

## 2020-08-29 PROCEDURE — 84484 ASSAY OF TROPONIN QUANT: CPT | Performed by: NURSE PRACTITIONER

## 2020-08-29 PROCEDURE — 99284 EMERGENCY DEPT VISIT MOD MDM: CPT

## 2020-08-29 RX ORDER — LIDOCAINE 50 MG/G
2 PATCH TOPICAL EVERY 24 HOURS
Qty: 10 PATCH | Refills: 0 | Status: SHIPPED | OUTPATIENT
Start: 2020-08-29 | End: 2023-01-19

## 2020-08-29 RX ORDER — TIZANIDINE HYDROCHLORIDE 2 MG/1
2 CAPSULE, GELATIN COATED ORAL 3 TIMES DAILY PRN
Qty: 15 CAPSULE | Refills: 0 | Status: SHIPPED | OUTPATIENT
Start: 2020-08-29 | End: 2021-08-10

## 2020-08-29 RX ORDER — LIDOCAINE 50 MG/G
1 PATCH TOPICAL ONCE
Status: DISCONTINUED | OUTPATIENT
Start: 2020-08-29 | End: 2020-08-29 | Stop reason: HOSPADM

## 2020-08-29 RX ORDER — CYCLOBENZAPRINE HCL 10 MG
10 TABLET ORAL ONCE
Status: DISCONTINUED | OUTPATIENT
Start: 2020-08-29 | End: 2020-08-29 | Stop reason: HOSPADM

## 2020-08-29 RX ADMIN — IOPAMIDOL 100 ML: 755 INJECTION, SOLUTION INTRAVENOUS at 18:03

## 2020-09-01 ENCOUNTER — OFFICE VISIT (OUTPATIENT)
Dept: FAMILY MEDICINE CLINIC | Facility: CLINIC | Age: 65
End: 2020-09-01

## 2020-09-01 VITALS
SYSTOLIC BLOOD PRESSURE: 124 MMHG | RESPIRATION RATE: 10 BRPM | DIASTOLIC BLOOD PRESSURE: 80 MMHG | BODY MASS INDEX: 22.81 KG/M2 | TEMPERATURE: 97.1 F | WEIGHT: 168.2 LBS | HEART RATE: 82 BPM

## 2020-09-01 DIAGNOSIS — M25.522 BILATERAL ELBOW JOINT PAIN: ICD-10-CM

## 2020-09-01 DIAGNOSIS — S70.02XA CONTUSION OF LEFT HIP, INITIAL ENCOUNTER: ICD-10-CM

## 2020-09-01 DIAGNOSIS — F43.11 ACUTE POSTTRAUMATIC STRESS SYNDROME: ICD-10-CM

## 2020-09-01 DIAGNOSIS — S70.01XA CONTUSION OF RIGHT HIP, INITIAL ENCOUNTER: ICD-10-CM

## 2020-09-01 DIAGNOSIS — S50.01XA CONTUSION OF RIGHT ELBOW, INITIAL ENCOUNTER: ICD-10-CM

## 2020-09-01 DIAGNOSIS — S50.02XA CONTUSION OF LEFT ELBOW, INITIAL ENCOUNTER: ICD-10-CM

## 2020-09-01 DIAGNOSIS — M25.551 BILATERAL HIP PAIN: ICD-10-CM

## 2020-09-01 DIAGNOSIS — M25.521 BILATERAL ELBOW JOINT PAIN: ICD-10-CM

## 2020-09-01 DIAGNOSIS — S20.211D RIB CONTUSION, RIGHT, SUBSEQUENT ENCOUNTER: ICD-10-CM

## 2020-09-01 DIAGNOSIS — M25.552 BILATERAL HIP PAIN: ICD-10-CM

## 2020-09-01 DIAGNOSIS — S22.42XD: Primary | ICD-10-CM

## 2020-09-01 PROCEDURE — 99214 OFFICE O/P EST MOD 30 MIN: CPT | Performed by: FAMILY MEDICINE

## 2020-09-01 NOTE — ASSESSMENT & PLAN NOTE
We will go and obtain x-rays of his hips.  I think he just has a trochanteric bursitis from his injury.  Hopefully will improve over time and if not can have him see orthopedics
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
## 2020-09-03 DIAGNOSIS — S70.01XA CONTUSION OF RIGHT HIP, INITIAL ENCOUNTER: ICD-10-CM

## 2020-09-03 DIAGNOSIS — I25.10 ATHEROSCLEROSIS OF CORONARY ARTERY OF NATIVE HEART WITHOUT ANGINA PECTORIS, UNSPECIFIED VESSEL OR LESION TYPE: Primary | ICD-10-CM

## 2020-09-03 DIAGNOSIS — S50.02XA CONTUSION OF LEFT ELBOW, INITIAL ENCOUNTER: ICD-10-CM

## 2020-09-03 DIAGNOSIS — S50.01XA CONTUSION OF RIGHT ELBOW, INITIAL ENCOUNTER: ICD-10-CM

## 2020-09-03 DIAGNOSIS — S70.02XA CONTUSION OF LEFT HIP, INITIAL ENCOUNTER: ICD-10-CM

## 2020-09-03 DIAGNOSIS — S42.401D CLOSED FRACTURE OF RIGHT ELBOW WITH ROUTINE HEALING, SUBSEQUENT ENCOUNTER: Primary | ICD-10-CM

## 2020-09-04 ENCOUNTER — TRANSCRIBE ORDERS (OUTPATIENT)
Dept: FAMILY MEDICINE CLINIC | Facility: CLINIC | Age: 65
End: 2020-09-04

## 2020-09-04 DIAGNOSIS — Z13.9 ENCOUNTER FOR HEALTH-RELATED SCREENING: Primary | ICD-10-CM

## 2020-09-09 ENCOUNTER — OFFICE VISIT (OUTPATIENT)
Dept: ORTHOPEDIC SURGERY | Facility: CLINIC | Age: 65
End: 2020-09-09

## 2020-09-09 VITALS
DIASTOLIC BLOOD PRESSURE: 70 MMHG | WEIGHT: 166 LBS | HEART RATE: 86 BPM | SYSTOLIC BLOOD PRESSURE: 105 MMHG | HEIGHT: 72 IN | BODY MASS INDEX: 22.48 KG/M2

## 2020-09-09 DIAGNOSIS — M25.521 RIGHT ELBOW PAIN: Primary | ICD-10-CM

## 2020-09-09 PROCEDURE — 99243 OFF/OP CNSLTJ NEW/EST LOW 30: CPT | Performed by: ORTHOPAEDIC SURGERY
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
## 2020-09-15 ENCOUNTER — TELEPHONE (OUTPATIENT)
Dept: FAMILY MEDICINE CLINIC | Facility: CLINIC | Age: 65
End: 2020-09-15

## 2020-09-15 RX ORDER — CYCLOBENZAPRINE HCL 10 MG
10 TABLET ORAL 3 TIMES DAILY PRN
Qty: 90 TABLET | Refills: 1 | Status: SHIPPED | OUTPATIENT
Start: 2020-09-15 | End: 2021-10-20
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
## 2020-09-15 NOTE — TELEPHONE ENCOUNTER
I called the patient and he has a CT calcium score scheduled but will not be till December which is fine.  He was not able to get into a therapist until December so I gave him the names of the [facility] and [facility] to try them and if that does not work out we will be happy to check for some more places for him.  I also gave him the name of [facility] [place].

## 2020-09-18 ENCOUNTER — APPOINTMENT (OUTPATIENT)
Dept: CT IMAGING | Facility: HOSPITAL | Age: 65
End: 2020-09-18

## 2020-09-18 ENCOUNTER — APPOINTMENT (OUTPATIENT)
Dept: CARDIOLOGY | Facility: HOSPITAL | Age: 65
End: 2020-09-18

## 2020-09-21 RX ORDER — ALPRAZOLAM 1 MG/1
1 TABLET ORAL 3 TIMES DAILY PRN
Qty: 270 TABLET | Refills: 1 | Status: CANCELLED | OUTPATIENT
Start: 2020-09-21

## 2020-09-21 NOTE — TELEPHONE ENCOUNTER
"    Caller: Feliz Rueda JOSE    Relationship: Self    Best call back number: [phone]    Medication needed:   Requested Prescriptions     Pending Prescriptions Disp Refills   • ALPRAZolam (Xanax) 1 MG tablet 270 tablet 1     Sig: Take 1 tablet by mouth 3 (Three) Times a Day As Needed for Anxiety.       When do you need the refill by: 9/24/20    What details did the patient provide when requesting the medication: PATIENT IS REQUEST TO CHANGE MEDICATION TO A \"TIME RELEASE\"    Does the patient have less than a 3 day supply:  [] Yes  [x] No    What is the patient's preferred pharmacy: [facility] - [facility], [address] [phone] [facility] [phone]              "

## 2020-09-22 ENCOUNTER — TELEPHONE (OUTPATIENT)
Dept: FAMILY MEDICINE CLINIC | Facility: CLINIC | Age: 65
End: 2020-09-22

## 2020-09-22 NOTE — TELEPHONE ENCOUNTER
"There's not a \"time release\" xanax as far as I know - may need to switch to another med.  Please ask him to discuss with dr saab tomorrow at his appt, since dr saab is currently out of the office  "

## 2020-09-22 NOTE — TELEPHONE ENCOUNTER
PATIENT WOULD LIKE TO GET RX PUSHED THROUGH [facility] FOR ALPRAZOLAM 1 MG / 3 TIMES A DAY /TIME RELEASE TABLET. HE IS AFRAID HE IS GOING TO RUN OUT OF MEDICATION .     [facility] - [place], [place] - [address] [place] [address] - [phone]  - [phone]   [phone]  Associate Signed OrdersPatient EstimateProvidersCurrent Interactions

## 2020-09-23 ENCOUNTER — OFFICE VISIT (OUTPATIENT)
Dept: FAMILY MEDICINE CLINIC | Facility: CLINIC | Age: 65
End: 2020-09-23

## 2020-09-23 VITALS
DIASTOLIC BLOOD PRESSURE: 73 MMHG | SYSTOLIC BLOOD PRESSURE: 110 MMHG | TEMPERATURE: 97.5 F | WEIGHT: 170.8 LBS | BODY MASS INDEX: 23.16 KG/M2 | RESPIRATION RATE: 12 BRPM | HEART RATE: 85 BPM

## 2020-09-23 DIAGNOSIS — S20.211D RIB CONTUSION, RIGHT, SUBSEQUENT ENCOUNTER: ICD-10-CM

## 2020-09-23 DIAGNOSIS — M25.551 BILATERAL HIP PAIN: ICD-10-CM

## 2020-09-23 DIAGNOSIS — M25.522 BILATERAL ELBOW JOINT PAIN: ICD-10-CM

## 2020-09-23 DIAGNOSIS — M25.521 BILATERAL ELBOW JOINT PAIN: ICD-10-CM

## 2020-09-23 DIAGNOSIS — S22.42XD: Primary | ICD-10-CM

## 2020-09-23 DIAGNOSIS — M25.552 BILATERAL HIP PAIN: ICD-10-CM

## 2020-09-23 DIAGNOSIS — F43.11 ACUTE POSTTRAUMATIC STRESS SYNDROME: ICD-10-CM

## 2020-09-23 DIAGNOSIS — Z23 NEED FOR IMMUNIZATION AGAINST INFLUENZA: ICD-10-CM

## 2020-09-23 PROCEDURE — 90471 IMMUNIZATION ADMIN: CPT | Performed by: FAMILY MEDICINE

## 2020-09-23 PROCEDURE — 99213 OFFICE O/P EST LOW 20 MIN: CPT | Performed by: FAMILY MEDICINE

## 2020-09-23 PROCEDURE — 90686 IIV4 VACC NO PRSV 0.5 ML IM: CPT | Performed by: FAMILY MEDICINE

## 2020-09-23 RX ORDER — ALPRAZOLAM 1 MG/1
TABLET, EXTENDED RELEASE ORAL
Qty: 270 TABLET | Refills: 1 | Status: SHIPPED | OUTPATIENT
Start: 2020-09-23 | End: 2020-09-25 | Stop reason: SDUPTHER
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
## 2020-09-25 RX ORDER — ALPRAZOLAM 1 MG/1
TABLET, EXTENDED RELEASE ORAL
Qty: 270 TABLET | Refills: 1 | Status: SHIPPED | OUTPATIENT
Start: 2020-09-25 | End: 2020-09-30

## 2020-09-25 NOTE — TELEPHONE ENCOUNTER
Caller: Feliz Rueda    Relationship: Self    Best call back number: [phone]    Medication needed:   Requested Prescriptions     Pending Prescriptions Disp Refills   • ALPRAZolam XR (Xanax XR) 1 MG 24 hr tablet 270 tablet 1     Si tablet tid prn           Does the patient have less than a 3 day supply:  [x] Yes  [] No    What is the patient's preferred pharmacy: [facility] - [address] [phone] [place] [phone]

## 2020-09-25 NOTE — ASSESSMENT & PLAN NOTE
This continues to be an issue.  I gave him the names of [facility] well as [facility] and if he cannot get a counselor there he is to call back and we will help him find 1

## 2020-09-25 NOTE — ASSESSMENT & PLAN NOTE
This seems to improved remarkably although his range of motion of his left elbow in extension is decreased as compared to the right

## 2020-09-28 ENCOUNTER — TELEPHONE (OUTPATIENT)
Dept: FAMILY MEDICINE CLINIC | Facility: CLINIC | Age: 65
End: 2020-09-28

## 2020-09-28 NOTE — TELEPHONE ENCOUNTER
He wanted the XR called and so I did, it is okay to do it 3 times a day, if they do not fill it, then let me know

## 2020-09-28 NOTE — TELEPHONE ENCOUNTER
The question they have is regarding the rx you gave for alprazolam XR for one tid      They believe that is dosed qd    Do you want regular release  As he usually gets?    If he needs this med in such a hurry he should get it local   Not call angry at the [facility]

## 2020-09-28 NOTE — TELEPHONE ENCOUNTER
PATIENT CALLED FRUSTRATED ABOUT ALPRAZOLAM REFILLS, STATING THAT HE RECEIVED AN EMAIL FROM [facility], THEY'VE  BEEN TRYING TO REACH THE OFFICE TO GET TO TALK TO DOCTOR. ADVISED PATIENT MEDICATION WAS ALREADY CALLED IN ON Friday, WAS NOT SURE WHAT'S GOING ON. PATIENT GOT EVEN MORE FRUSTRATED SAYING DOCTOR NEEDS TO SPEAK TO THEM, ADVISED I WILL PUT A NOTE FOR THE DOCTOR. PATIENT HUNG UP BEFORE I CONFIRMED.    [facility] - [place], [address] [phone] [facility] [phone] FX

## 2020-09-30 ENCOUNTER — TELEPHONE (OUTPATIENT)
Dept: FAMILY MEDICINE CLINIC | Facility: CLINIC | Age: 65
End: 2020-09-30

## 2020-09-30 RX ORDER — ALPRAZOLAM 1 MG/1
1 TABLET ORAL 3 TIMES DAILY PRN
Qty: 270 TABLET | Refills: 1 | Status: SHIPPED | OUTPATIENT
Start: 2020-09-30 | End: 2020-12-11 | Stop reason: SDUPTHER

## 2020-09-30 NOTE — TELEPHONE ENCOUNTER
PATIENT CALLED AND WOULD LIKE TO SEE IF HE CAN GET ALPRAZOLAM 1 MG 3 TIMES A DAY WHICH WAS THE ORIGINAL MEDICATION HE WAS ON. PATIENT STATES THAT HE IS COMPLETELY OUT OF THIS MEDICATION. PATIENT WOULD LIKE A 90 DAY IF POSSIBLE.     [facility] - [place], [place] - [address] [place] [address] [phone] [facility] [phone] FX

## 2020-10-12 ENCOUNTER — OFFICE VISIT (OUTPATIENT)
Dept: FAMILY MEDICINE CLINIC | Facility: CLINIC | Age: 65
End: 2020-10-12

## 2020-10-12 VITALS
WEIGHT: 169 LBS | HEART RATE: 102 BPM | DIASTOLIC BLOOD PRESSURE: 82 MMHG | RESPIRATION RATE: 10 BRPM | TEMPERATURE: 98.4 F | SYSTOLIC BLOOD PRESSURE: 120 MMHG | BODY MASS INDEX: 22.92 KG/M2

## 2020-10-12 DIAGNOSIS — S16.1XXD ACUTE STRAIN OF NECK MUSCLE, SUBSEQUENT ENCOUNTER: Primary | ICD-10-CM

## 2020-10-12 DIAGNOSIS — S22.42XD: ICD-10-CM

## 2020-10-12 DIAGNOSIS — M70.62 TROCHANTERIC BURSITIS OF LEFT HIP: ICD-10-CM

## 2020-10-12 DIAGNOSIS — S20.211D RIB CONTUSION, RIGHT, SUBSEQUENT ENCOUNTER: ICD-10-CM

## 2020-10-12 PROBLEM — S16.1XXA ACUTE STRAIN OF NECK MUSCLE: Status: ACTIVE | Noted: 2020-10-12

## 2020-10-12 PROBLEM — M54.2 CERVICALGIA: Status: ACTIVE | Noted: 2020-10-12

## 2020-10-12 PROCEDURE — 20610 DRAIN/INJ JOINT/BURSA W/O US: CPT | Performed by: FAMILY MEDICINE

## 2020-10-12 PROCEDURE — 99213 OFFICE O/P EST LOW 20 MIN: CPT | Performed by: FAMILY MEDICINE

## 2020-10-12 RX ORDER — TRIAMCINOLONE ACETONIDE 40 MG/ML
40 INJECTION, SUSPENSION INTRA-ARTICULAR; INTRAMUSCULAR
Status: COMPLETED | OUTPATIENT
Start: 2020-10-12 | End: 2020-10-12

## 2020-10-12 RX ADMIN — TRIAMCINOLONE ACETONIDE 40 MG: 40 INJECTION, SUSPENSION INTRA-ARTICULAR; INTRAMUSCULAR at 16:15

## 2020-10-12 NOTE — ASSESSMENT & PLAN NOTE
We will go ahead and obtain an MRI of his cervical spine since he seems to have more pain going down into the medial border of his scapula.  We will also start physical therapy.  Need to consider pain management.

## 2020-10-12 NOTE — ASSESSMENT & PLAN NOTE
Patient would like to have an injection so we under sterile procedure, Betadine, 2 cc 1% lidocaine without epinephrine with 1 cc of Kenalog into the left lateral hip without complication.  Patient tolerated procedure
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
## 2020-10-12 NOTE — ASSESSMENT & PLAN NOTE
Continue symptomatic care.  His original x-ray was negative, I do not see any reason to repeat an x-ray because it is not going to change his treatment.  I think this will improve with time

## 2020-10-14 ENCOUNTER — TELEPHONE (OUTPATIENT)
Dept: FAMILY MEDICINE CLINIC | Facility: CLINIC | Age: 65
End: 2020-10-14

## 2020-10-14 DIAGNOSIS — M25.522 BILATERAL ELBOW JOINT PAIN: ICD-10-CM

## 2020-10-14 DIAGNOSIS — M25.521 BILATERAL ELBOW JOINT PAIN: ICD-10-CM

## 2020-10-14 DIAGNOSIS — S20.211D RIB CONTUSION, RIGHT, SUBSEQUENT ENCOUNTER: ICD-10-CM

## 2020-10-14 DIAGNOSIS — M25.552 HIP PAIN, BILATERAL: Primary | ICD-10-CM

## 2020-10-14 DIAGNOSIS — M25.551 HIP PAIN, BILATERAL: Primary | ICD-10-CM

## 2020-10-14 NOTE — TELEPHONE ENCOUNTER
Spoke with patient and he said he wants to see the orthopedic for both hips and both elbows.  Said his  wants him seen for those things.  Also, patient wants an xray order sent to [facility] for his right side rib area.  It is swelling and he suspects he might have broken ribs that were not identified on the original xrays at the hospital.  Notify patient after that order has been faxed.

## 2020-10-14 NOTE — TELEPHONE ENCOUNTER
Order/Referral was faxed over to [facility] for Xray and the referral was faxed over to Dr. Gardiner office and his office will contact patient to schedule appt.  Patient was notified that the order was faxed and the Ortho referral was placed.
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
## 2020-10-20 ENCOUNTER — TELEPHONE (OUTPATIENT)
Dept: FAMILY MEDICINE CLINIC | Facility: CLINIC | Age: 65
End: 2020-10-20

## 2020-10-20 DIAGNOSIS — S20.211D RIB CONTUSION, RIGHT, SUBSEQUENT ENCOUNTER: ICD-10-CM

## 2020-10-20 NOTE — TELEPHONE ENCOUNTER
Patient wanting to go over xrays of his ribs that was performed on 10/14/20. Would like callback at [phone]

## 2020-10-21 ENCOUNTER — TREATMENT (OUTPATIENT)
Dept: PHYSICAL THERAPY | Facility: CLINIC | Age: 65
End: 2020-10-21

## 2020-10-21 DIAGNOSIS — S16.1XXA ACUTE STRAIN OF NECK MUSCLE, INITIAL ENCOUNTER: Primary | ICD-10-CM

## 2020-10-21 PROCEDURE — 97162 PT EVAL MOD COMPLEX 30 MIN: CPT | Performed by: PHYSICAL THERAPIST

## 2020-10-21 PROCEDURE — 97140 MANUAL THERAPY 1/> REGIONS: CPT | Performed by: PHYSICAL THERAPIST

## 2020-10-21 PROCEDURE — 97110 THERAPEUTIC EXERCISES: CPT | Performed by: PHYSICAL THERAPIST
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
## 2020-10-23 DIAGNOSIS — M50.20 HERNIATED CERVICAL DISC: Primary | ICD-10-CM

## 2020-10-26 ENCOUNTER — OFFICE VISIT (OUTPATIENT)
Dept: ORTHOPEDIC SURGERY | Facility: CLINIC | Age: 65
End: 2020-10-26

## 2020-10-26 VITALS
BODY MASS INDEX: 22.92 KG/M2 | WEIGHT: 169 LBS | HEART RATE: 78 BPM | SYSTOLIC BLOOD PRESSURE: 106 MMHG | DIASTOLIC BLOOD PRESSURE: 71 MMHG

## 2020-10-26 DIAGNOSIS — M25.521 RIGHT ELBOW PAIN: Primary | ICD-10-CM

## 2020-10-26 DIAGNOSIS — M25.522 LEFT ELBOW PAIN: ICD-10-CM

## 2020-10-26 PROCEDURE — 99212 OFFICE O/P EST SF 10 MIN: CPT | Performed by: ORTHOPAEDIC SURGERY

## 2020-10-26 NOTE — PROGRESS NOTES
Patient ID: Feliz Rueda is a 65 y.o. male.  Bilateral elbow pain  Feliz is a 65 gentleman here with bilateral elbow pain.  Right side has had complete resolution of symptoms.  Left side as well to no pain but a little bit about stiffness    Review of Systems:    Improving elbow pain    Objective:    /71   Pulse 78   Wt 76.7 kg (169 lb)   BMI 22.92 kg/m²     Physical Examination:     He is a pleasant male in no distress. He is alert and oriented x3 and appears his stated age.  Both elbows demonstrate intact skin without tenderness, range of motion on the left is about 2 to 3 degrees short of full hyperextension.  Otherwise full range of motion of each elbow is noted.Sensory and motor exam are intact all distributions. Radial pulse is palpable and capillary refill is less than two seconds to all digits    Imaging:       Assessment:    Bilateral elbow pain improving    Plan:  Continue home exercise program for stretching, otherwise activity as tolerated and see me as needed          Disclaimer: Please note that areas of this note were completed with computer voice recognition software.  Quite often unanticipated grammatical, syntax, homophones, and other interpretive errors are inadvertently transcribed by the computer software. Please excuse any errors that have escaped final proofreading.

## 2020-10-29 ENCOUNTER — TREATMENT (OUTPATIENT)
Dept: PHYSICAL THERAPY | Facility: CLINIC | Age: 65
End: 2020-10-29

## 2020-10-29 DIAGNOSIS — S16.1XXA ACUTE STRAIN OF NECK MUSCLE, INITIAL ENCOUNTER: Primary | ICD-10-CM

## 2020-10-29 PROCEDURE — 97110 THERAPEUTIC EXERCISES: CPT | Performed by: PHYSICAL THERAPIST

## 2020-10-29 PROCEDURE — 97140 MANUAL THERAPY 1/> REGIONS: CPT | Performed by: PHYSICAL THERAPIST

## 2020-10-29 NOTE — PROGRESS NOTES
Physical Therapy Daily Progress Note      Patient: Feliz Rueda   : 1955  Diagnosis/ICD-10 Code:  Acute strain of neck muscle, initial encounter [S16.1XXA]  Referring practitioner: Dajuan Thomas MD  Date of Initial Visit: Type: THERAPY  Noted: 10/21/2020  Today's Date: 10/29/2020  Patient seen for 2 sessions             Subjective Pt reports not much change overall.    Objective   See Exercise, Manual, and Modality Logs for complete treatment.       Assessment/Plan  Pt had decreased c/o pain in his left C-PVM with manual techniques.  He continued to have radicular symptoms into his right UE.  Good tolerance with exercises today.    Progress per Plan of Care           Timed:         Manual Therapy:    20     mins  99366;     Therapeutic Exercise:    25     mins  60429;     Neuromuscular Mackenzie:        mins  45986;    Therapeutic Activity:          mins  85502;     Gait Training:           mins  69934;     Ultrasound:          mins  81837;    Ionto                                   mins   96460  Self Care                            mins   90542      Un-Timed:  Electrical Stimulation:         mins  56360 ( );  Dry Needling          mins self-pay  Traction          mins 93958      Timed Treatment:   45   mins   Total Treatment:     45   mins    Ilya Smith PTA  Physical Therapist Assistant

## 2020-11-03 ENCOUNTER — TREATMENT (OUTPATIENT)
Dept: PHYSICAL THERAPY | Facility: CLINIC | Age: 65
End: 2020-11-03

## 2020-11-03 DIAGNOSIS — S16.1XXA ACUTE STRAIN OF NECK MUSCLE, INITIAL ENCOUNTER: Primary | ICD-10-CM

## 2020-11-03 PROCEDURE — 97014 ELECTRIC STIMULATION THERAPY: CPT | Performed by: PHYSICAL THERAPIST

## 2020-11-03 PROCEDURE — 97140 MANUAL THERAPY 1/> REGIONS: CPT | Performed by: PHYSICAL THERAPIST

## 2020-11-04 NOTE — PROGRESS NOTES
Physical Therapy Daily Progress Note      Patient: Feliz Rueda   : 1955  Diagnosis/ICD-10 Code:  Acute strain of neck muscle, initial encounter [S16.1XXA]  Referring practitioner: Dajuan Thomas MD  Date of Initial Visit: Type: THERAPY  Noted: 10/21/2020  Today's Date: 2020  Patient seen for 3 sessions         Feliz Rueda reports: Feeling better overall.  Still has stiffness and pain occasionally.      Objective   See Exercise, Manual, and Modality Logs for complete treatment.       Assessment/Plan     Tolerates modalities and manual therapy well this visit.  Decreased pain reported post tx.     Progress per Plan of Care           Manual Therapy:    25    mins  02272;  Therapeutic Exercise:         mins  45124;     Neuromuscular Mackenzie:        mins  89148;    Therapeutic Activity:          mins  63563;     Gait Training:           mins  92989;     Ultrasound:          mins  44552;    Electrical Stimulation:    15     mins  98132 ( );  Dry Needling          mins self-pay    Timed Treatment:   25  mins   Total Treatment:     40   mins    Jose David Chapin PT  Physical Therapist

## 2020-11-06 ENCOUNTER — TREATMENT (OUTPATIENT)
Dept: PHYSICAL THERAPY | Facility: CLINIC | Age: 65
End: 2020-11-06

## 2020-11-06 DIAGNOSIS — S16.1XXA ACUTE STRAIN OF NECK MUSCLE, INITIAL ENCOUNTER: Primary | ICD-10-CM

## 2020-11-06 PROCEDURE — 97140 MANUAL THERAPY 1/> REGIONS: CPT | Performed by: PHYSICAL THERAPIST

## 2020-11-06 PROCEDURE — 97014 ELECTRIC STIMULATION THERAPY: CPT | Performed by: PHYSICAL THERAPIST

## 2020-11-06 NOTE — PROGRESS NOTES
Physical Therapy Daily Progress Note    Patient: Feliz Rueda   : 1955  Diagnosis/ICD-10 Code:  Acute strain of neck muscle, initial encounter [S16.1XXA]  Referring practitioner: Dajuan Thomas MD  Date of Initial Visit: Type: THERAPY  Noted: 10/21/2020  Today's Date: 2020  Patient seen for 4 sessions         Feliz Rueda reports:  Soreness on right side of cervical spine that radiates into right arm continues.  No new complaints.  Felt better after last visit.    Objective   See Exercise, Manual, and Modality Logs for complete treatment.       Assessment/Plan     Feels better after manual traction and STM.  Patient educated that if pain in increased after home exercises to hold that exercise for a day, do not push through pain.     Progress per Plan of Care           Manual Therapy:    25     mins  08214;  Therapeutic Exercise:   5     mins  89197;     Neuromuscular Mackenzie:        mins  57050;    Therapeutic Activity:          mins  98946;     Gait Training:           mins  04003;     Ultrasound:          mins  28744;    Electrical Stimulation:    15     mins  72114 ( );  Dry Needling          mins self-pay    Timed Treatment:   30   mins   Total Treatment:     45   mins    Jose David Chapin PT  Physical Therapist

## 2020-11-09 ENCOUNTER — TREATMENT (OUTPATIENT)
Dept: PHYSICAL THERAPY | Facility: CLINIC | Age: 65
End: 2020-11-09

## 2020-11-09 DIAGNOSIS — S16.1XXA ACUTE STRAIN OF NECK MUSCLE, INITIAL ENCOUNTER: Primary | ICD-10-CM

## 2020-11-09 PROCEDURE — 97014 ELECTRIC STIMULATION THERAPY: CPT | Performed by: PHYSICAL THERAPIST

## 2020-11-09 PROCEDURE — 97140 MANUAL THERAPY 1/> REGIONS: CPT | Performed by: PHYSICAL THERAPIST

## 2020-11-09 NOTE — PROGRESS NOTES
Physical Therapy Daily Progress Note    Patient: Feliz Rueda   : 1955  Diagnosis/ICD-10 Code:  Acute strain of neck muscle, initial encounter [S16.1XXA]  Referring practitioner: Dajuan Thomas MD  Date of Initial Visit: Type: THERAPY  Noted: 10/21/2020  Today's Date: 2020  Patient seen for 5 sessions         Feliz Rueda reports:  Feeling better overall.  Reports mild pain in cervical spine and into RUE continues.  Reports that he has been avoiding doing strenuous activities (yardwork) for fear of increasing symptoms.     Objective   See Exercise, Manual, and Modality Logs for complete treatment.       Assessment/Plan     Tolerates manual therapy well.  Decreased muscular guarding noted during treatment.  Patient progressing well.     Progress per Plan of Care           Manual Therapy:    25     mins  63197;  Therapeutic Exercise:         mins  14813;     Neuromuscular Mackenzie:        mins  49277;    Therapeutic Activity:          mins  24548;     Gait Training:           mins  48921;     Ultrasound:          mins  80834;    Electrical Stimulation:    15     mins  01339 ( );  Dry Needling          mins self-pay    Timed Treatment:   25   mins   Total Treatment:     40   mins    Jose David Chapin PT  Physical Therapist

## 2020-11-11 ENCOUNTER — TREATMENT (OUTPATIENT)
Dept: PHYSICAL THERAPY | Facility: CLINIC | Age: 65
End: 2020-11-11

## 2020-11-11 DIAGNOSIS — S16.1XXA ACUTE STRAIN OF NECK MUSCLE, INITIAL ENCOUNTER: Primary | ICD-10-CM

## 2020-11-11 PROCEDURE — 97014 ELECTRIC STIMULATION THERAPY: CPT | Performed by: PHYSICAL THERAPIST

## 2020-11-11 PROCEDURE — 97140 MANUAL THERAPY 1/> REGIONS: CPT | Performed by: PHYSICAL THERAPIST

## 2020-11-12 NOTE — PROGRESS NOTES
Physical Therapy Daily Progress Note      Patient: Feliz Rueda   : 1955  Diagnosis/ICD-10 Code:  Acute strain of neck muscle, initial encounter [S16.1XXA]  Referring practitioner: Dajuan Thomas MD  Date of Initial Visit: Type: THERAPY  Noted: 10/21/2020  Today's Date: 2020  Patient seen for 6 sessions         Feliz Rueda reports:  Feeling better.  Still some discomfort when he performs ADL's.      Objective   See Exercise, Manual, and Modality Logs for complete treatment.       Assessment/Plan     Tolerates manual therapy well.  Mild ROM restriction still present. Progressing well.     Progress per Plan of Care           Manual Therapy:    25     mins  49843;  Therapeutic Exercise:         mins  93224;     Neuromuscular Mackenzie:        mins  23534;    Therapeutic Activity:          mins  30899;     Gait Training:           mins  74396;     Ultrasound:          mins  59876;    Electrical Stimulation:    15     mins  83162 ( );  Dry Needling          mins self-pay    Timed Treatment:   25   mins   Total Treatment:     40   mins    Jose David Chapin PT  Physical Therapist

## 2020-11-16 ENCOUNTER — TREATMENT (OUTPATIENT)
Dept: PHYSICAL THERAPY | Facility: CLINIC | Age: 65
End: 2020-11-16

## 2020-11-16 DIAGNOSIS — S16.1XXA ACUTE STRAIN OF NECK MUSCLE, INITIAL ENCOUNTER: Primary | ICD-10-CM

## 2020-11-16 PROCEDURE — 97014 ELECTRIC STIMULATION THERAPY: CPT | Performed by: PHYSICAL THERAPIST

## 2020-11-16 PROCEDURE — 97140 MANUAL THERAPY 1/> REGIONS: CPT | Performed by: PHYSICAL THERAPIST

## 2020-11-17 NOTE — PROGRESS NOTES
"   Physical Therapy Daily Progress Note    Patient: Feliz Rueda   : 1955  Diagnosis/ICD-10 Code:  Acute strain of neck muscle, initial encounter [S16.1XXA]  Referring practitioner: Dajuan Thomas MD  Date of Initial Visit: Type: THERAPY  Noted: 10/21/2020  Today's Date: 2020  Patient seen for 7 sessions         Feliz Rueda reports:  Feeling better overall.  Reports that he has been \"taking it easy\" in order to avoid increase in symptoms.     Objective   See Exercise, Manual, and Modality Logs for complete treatment.       Assessment/Plan     Tolerates modalities and manual therapy well.  Improving PROM noted in cervical spine.  Patient compliant with HEP.     Progress per Plan of Care           Manual Therapy:    25     mins  67547;  Therapeutic Exercise:         mins  35048;     Neuromuscular Mackenzie:        mins  35761;    Therapeutic Activity:          mins  46394;     Gait Training:           mins  60243;     Ultrasound:          mins  44040;    Electrical Stimulation:    15     mins  12938 ( );  Dry Needling          mins self-pay    Timed Treatment:   25   mins   Total Treatment:     40   mins    Jose David Chapin PT  Physical Therapist                    "

## 2020-11-18 ENCOUNTER — TREATMENT (OUTPATIENT)
Dept: PHYSICAL THERAPY | Facility: CLINIC | Age: 65
End: 2020-11-18

## 2020-11-18 DIAGNOSIS — S16.1XXA ACUTE STRAIN OF NECK MUSCLE, INITIAL ENCOUNTER: Primary | ICD-10-CM

## 2020-11-18 PROCEDURE — 97140 MANUAL THERAPY 1/> REGIONS: CPT | Performed by: PHYSICAL THERAPIST

## 2020-11-18 PROCEDURE — 97014 ELECTRIC STIMULATION THERAPY: CPT | Performed by: PHYSICAL THERAPIST

## 2020-11-18 NOTE — PROGRESS NOTES
Physical Therapy Daily Progress Note    Patient: Feliz Rueda   : 1955  Diagnosis/ICD-10 Code:  Acute strain of neck muscle, initial encounter [S16.1XXA]  Referring practitioner: Dajuan Thomas MD  Date of Initial Visit: Type: THERAPY  Noted: 10/21/2020  Today's Date: 2020  Patient seen for 8 sessions         Feliz Rueda reports:  Increased pain on left side of cervical spine today.  States that he was changing light bulbs yesterday and noticed pain increased after this activity.      Objective   See Exercise, Manual, and Modality Logs for complete treatment.       Assessment/Plan     Tolerates manual therapy well this visit.  Decreased left rotation of cervical spine noted this visit, improved after MET.  Progressing well.    Progress per Plan of Care           Manual Therapy:    25     mins  92717;  Therapeutic Exercise:         mins  79008;     Neuromuscular Mackenzie:        mins  06218;    Therapeutic Activity:          mins  24087;     Gait Training:          mins  51706;     Ultrasound:          mins  74945;    Electrical Stimulation:    15     mins  19580 ( );  Dry Needling          mins self-pay    Timed Treatment:   25   mins   Total Treatment:     40   mins    Jose David Chapin PT  Physical Therapist

## 2020-11-23 ENCOUNTER — TREATMENT (OUTPATIENT)
Dept: PHYSICAL THERAPY | Facility: CLINIC | Age: 65
End: 2020-11-23

## 2020-11-23 DIAGNOSIS — S16.1XXA ACUTE STRAIN OF NECK MUSCLE, INITIAL ENCOUNTER: Primary | ICD-10-CM

## 2020-11-23 PROCEDURE — 97014 ELECTRIC STIMULATION THERAPY: CPT | Performed by: PHYSICAL THERAPIST

## 2020-11-23 PROCEDURE — 97140 MANUAL THERAPY 1/> REGIONS: CPT | Performed by: PHYSICAL THERAPIST

## 2020-11-24 NOTE — PROGRESS NOTES
Physical Therapy Daily Progress Note      Patient: Feliz Rueda   : 1955  Diagnosis/ICD-10 Code:  Acute strain of neck muscle, initial encounter [S16.1XXA]  Referring practitioner: Dajuan Thomas MD  Date of Initial Visit: Type: THERAPY  Noted: 10/21/2020  Today's Date: 2020  Patient seen for 9 sessions         Feliz Rueda reports:  Wore a brace after doing some activity over the weekend due to soreness.  Feeling better today as he has taken it easy over the past 24 hours.     Objective   See Exercise, Manual, and Modality Logs for complete treatment.       Assessment/Plan     Feels better after manual therapy.  Patient still demonstrates ROM restriction with cervical rotation and sidebending.      Progress per Plan of Care           Manual Therapy:    30     mins  07200;  Therapeutic Exercise:         mins  11186;     Neuromuscular Mackenzie:        mins  53137;    Therapeutic Activity:          mins  09963;     Gait Training:          mins  36570;     Ultrasound:          mins  79848;    Electrical Stimulation:    15     mins  60824 ( );  Dry Needling          mins self-pay    Timed Treatment:   30   mins   Total Treatment:     45   mins    Jose David Chapin PT  Physical Therapist

## 2020-11-25 ENCOUNTER — TREATMENT (OUTPATIENT)
Dept: PHYSICAL THERAPY | Facility: CLINIC | Age: 65
End: 2020-11-25

## 2020-11-25 DIAGNOSIS — S16.1XXA ACUTE STRAIN OF NECK MUSCLE, INITIAL ENCOUNTER: Primary | ICD-10-CM

## 2020-11-25 PROCEDURE — 97140 MANUAL THERAPY 1/> REGIONS: CPT | Performed by: PHYSICAL THERAPIST

## 2020-11-25 PROCEDURE — 97014 ELECTRIC STIMULATION THERAPY: CPT | Performed by: PHYSICAL THERAPIST

## 2020-12-01 ENCOUNTER — HOSPITAL ENCOUNTER (OUTPATIENT)
Dept: CARDIOLOGY | Facility: HOSPITAL | Age: 65
Discharge: HOME | End: 2020-12-01

## 2020-12-01 ENCOUNTER — HOSPITAL ENCOUNTER (OUTPATIENT)
Dept: CT IMAGING | Facility: HOSPITAL | Age: 65
Discharge: HOME | End: 2020-12-01

## 2020-12-01 DIAGNOSIS — Z13.9 ENCOUNTER FOR HEALTH-RELATED SCREENING: ICD-10-CM

## 2020-12-01 DIAGNOSIS — I25.10 ATHEROSCLEROSIS OF CORONARY ARTERY OF NATIVE HEART WITHOUT ANGINA PECTORIS, UNSPECIFIED VESSEL OR LESION TYPE: ICD-10-CM

## 2020-12-01 LAB
BH CV XLRA MEAS - MID AO DIAM: 1.8 CM
BH CV XLRA MEAS - PAD LEFT ABI PT: 1.38
BH CV XLRA MEAS - PAD LEFT ARM: 115 MMHG
BH CV XLRA MEAS - PAD LEFT LEG PT: 162 MMHG
BH CV XLRA MEAS - PAD RIGHT ABI PT: 1.35
BH CV XLRA MEAS - PAD RIGHT ARM: 117 MMHG
BH CV XLRA MEAS - PAD RIGHT LEG PT: 158 MMHG
BH CV XLRA MEAS LEFT DIST CCA EDV: 36 CM/SEC
BH CV XLRA MEAS LEFT DIST CCA PSV: 115 CM/SEC
BH CV XLRA MEAS LEFT ICA/CCA RATIO: 0.8
BH CV XLRA MEAS LEFT MID CCA PSV: 115 CM/SEC
BH CV XLRA MEAS LEFT MID ICA PSV: 93 CM/SEC
BH CV XLRA MEAS LEFT PROX ICA EDV: -21.7 CM/SEC
BH CV XLRA MEAS LEFT PROX ICA PSV: -92.6 CM/SEC
BH CV XLRA MEAS RIGHT DIST CCA EDV: -26.7 CM/SEC
BH CV XLRA MEAS RIGHT DIST CCA PSV: -104 CM/SEC
BH CV XLRA MEAS RIGHT ICA/CCA RATIO: 0.8
BH CV XLRA MEAS RIGHT MID CCA PSV: 104 CM/SEC
BH CV XLRA MEAS RIGHT MID ICA PSV: 79 CM/SEC
BH CV XLRA MEAS RIGHT PROX ICA EDV: -20.5 CM/SEC
BH CV XLRA MEAS RIGHT PROX ICA PSV: -78.9 CM/SEC

## 2020-12-01 PROCEDURE — 93799 UNLISTED CV SVC/PROCEDURE: CPT

## 2020-12-01 PROCEDURE — 75571 CT HRT W/O DYE W/CA TEST: CPT

## 2020-12-03 ENCOUNTER — TREATMENT (OUTPATIENT)
Dept: PHYSICAL THERAPY | Facility: CLINIC | Age: 65
End: 2020-12-03

## 2020-12-03 DIAGNOSIS — S16.1XXA ACUTE STRAIN OF NECK MUSCLE, INITIAL ENCOUNTER: Primary | ICD-10-CM

## 2020-12-03 PROCEDURE — 97140 MANUAL THERAPY 1/> REGIONS: CPT | Performed by: PHYSICAL THERAPIST

## 2020-12-03 PROCEDURE — 97014 ELECTRIC STIMULATION THERAPY: CPT | Performed by: PHYSICAL THERAPIST

## 2020-12-03 PROCEDURE — 97112 NEUROMUSCULAR REEDUCATION: CPT | Performed by: PHYSICAL THERAPIST

## 2020-12-03 NOTE — PROGRESS NOTES
Physical Therapy Daily Progress Note  Feliz Rueda   1955   Primary Diagnosis: Acute strain of neck muscle, initial encounter [S16.1XXA]   Type: THERAPY  Noted: 10/21/2020   12/3/2020   Visits: 11       Subjective   Pt reports: Decreased pain after epidural earlier this week. Primarily with central pain and soreness. HEP going well.      Objective     See Exercise, Manual, and Modality Logs for complete treatment.     Patient Education: HEP     Assessment/Plan Decreased pain post rx. Guarding improved after manual therapy.      Progress per Plan of Care            Timed:         Manual Therapy:    25     mins  76866;     Therapeutic Exercise:         mins  51603;     Neuromuscular Mackenzie:    8    mins  04420;    Therapeutic Activity:          mins  32919;     Gait Training:           mins  34034;     Ultrasound:          mins  71190;    Ionto                                   mins   05296  Self Care                            mins   92553    Un-Timed:  Electrical Stimulation:    15     mins  44797 ( );  Traction          mins 81936  Low Eval          Mins  69018  Mod Eval          Mins  97859  High Eval                            Mins  85967  Re-Eval                               mins  07430    Timed Treatment:   33   mins   Total Treatment:     48   mins    Ari Bazzi, PT, DPT, OCS  [place] license: [id number]  Physical Therapist

## 2020-12-07 ENCOUNTER — TREATMENT (OUTPATIENT)
Dept: PHYSICAL THERAPY | Facility: CLINIC | Age: 65
End: 2020-12-07

## 2020-12-07 DIAGNOSIS — S16.1XXA ACUTE STRAIN OF NECK MUSCLE, INITIAL ENCOUNTER: Primary | ICD-10-CM

## 2020-12-07 PROCEDURE — 97140 MANUAL THERAPY 1/> REGIONS: CPT | Performed by: PHYSICAL THERAPIST

## 2020-12-07 PROCEDURE — 97014 ELECTRIC STIMULATION THERAPY: CPT | Performed by: PHYSICAL THERAPIST

## 2020-12-07 NOTE — PROGRESS NOTES
Physical Therapy Daily Progress Note    Patient: Feliz Rueda   : 1955  Diagnosis/ICD-10 Code:  Acute strain of neck muscle, initial encounter [S16.1XXA]  Referring practitioner: Dajuan Thomas MD  Date of Initial Visit: Type: THERAPY  Noted: 10/21/2020  Today's Date: 2020  Patient seen for 12 sessions         Feliz Rueda reports:  Feeling well over the weekend.  Reports he has not been doing much in order to avoid increasing neck pain.     Objective   See Exercise, Manual, and Modality Logs for complete treatment.       Assessment/Plan    Tolerates manual therapy well.  Patient encouraged to gradually resume household chore activities as tolerated.      Progress per Plan of Care           Manual Therapy:    25     mins  53027;  Therapeutic Exercise:         mins  38130;     Neuromuscular Mackenzie:        mins  15489;    Therapeutic Activity:          mins  10337;     Gait Training:           mins  19157;     Ultrasound:          mins  09916;    Electrical Stimulation:    15     mins  08457 ( );  Dry Needling          mins self-pay    Timed Treatment:   25   mins   Total Treatment:     40   mins    Jose David Chapin PT  Physical Therapist

## 2020-12-09 ENCOUNTER — TREATMENT (OUTPATIENT)
Dept: PHYSICAL THERAPY | Facility: CLINIC | Age: 65
End: 2020-12-09

## 2020-12-09 DIAGNOSIS — S16.1XXA ACUTE STRAIN OF NECK MUSCLE, INITIAL ENCOUNTER: Primary | ICD-10-CM

## 2020-12-09 PROCEDURE — 97014 ELECTRIC STIMULATION THERAPY: CPT | Performed by: PHYSICAL THERAPIST

## 2020-12-09 PROCEDURE — 97140 MANUAL THERAPY 1/> REGIONS: CPT | Performed by: PHYSICAL THERAPIST

## 2020-12-09 NOTE — PROGRESS NOTES
Physical Therapy Daily Progress Note    Patient: Feliz Rueda   : 1955  Diagnosis/ICD-10 Code:  Acute strain of neck muscle, initial encounter [S16.1XXA]  Referring practitioner: Dajuan Thomas MD  Date of Initial Visit: Type: THERAPY  Noted: 10/21/2020  Today's Date: 2020  Patient seen for 13 sessions         Feliz Rueda reports:  A little soreness in cervical spine this visit.  Reports feeling some discomfort into right UE.     Objective   See Exercise, Manual, and Modality Logs for complete treatment.       Assessment/Plan     Noted continued restriction with left cervical rotation.  Improvement noted after MET.  Hypomobility noted throughout cervical spine.     Progress per Plan of Care           Manual Therapy:    25     mins  91286;  Therapeutic Exercise:         mins  11084;     Neuromuscular Mackenzie:        mins  82351;    Therapeutic Activity:          mins  71522;     Gait Training:           mins  03233;     Ultrasound:          mins  10380;    Electrical Stimulation:    15     mins  76616 ( );  Dry Needling          mins self-pay    Timed Treatment:   25   mins   Total Treatment:     40   mins    Jose David Chapin PT  Physical Therapist

## 2020-12-11 ENCOUNTER — OFFICE VISIT (OUTPATIENT)
Dept: FAMILY MEDICINE CLINIC | Facility: CLINIC | Age: 65
End: 2020-12-11

## 2020-12-11 ENCOUNTER — TREATMENT (OUTPATIENT)
Dept: PHYSICAL THERAPY | Facility: CLINIC | Age: 65
End: 2020-12-11

## 2020-12-11 VITALS
HEART RATE: 77 BPM | SYSTOLIC BLOOD PRESSURE: 121 MMHG | RESPIRATION RATE: 12 BRPM | WEIGHT: 175.2 LBS | BODY MASS INDEX: 23.76 KG/M2 | DIASTOLIC BLOOD PRESSURE: 78 MMHG | TEMPERATURE: 97.8 F

## 2020-12-11 DIAGNOSIS — S16.1XXA ACUTE STRAIN OF NECK MUSCLE, INITIAL ENCOUNTER: Primary | ICD-10-CM

## 2020-12-11 DIAGNOSIS — S70.02XA CONTUSION OF LEFT HIP, INITIAL ENCOUNTER: ICD-10-CM

## 2020-12-11 DIAGNOSIS — F43.11 ACUTE POSTTRAUMATIC STRESS SYNDROME: ICD-10-CM

## 2020-12-11 DIAGNOSIS — S20.211D RIB CONTUSION, RIGHT, SUBSEQUENT ENCOUNTER: ICD-10-CM

## 2020-12-11 DIAGNOSIS — M54.2 NECK PAIN: Primary | ICD-10-CM

## 2020-12-11 PROCEDURE — 97014 ELECTRIC STIMULATION THERAPY: CPT | Performed by: PHYSICAL THERAPIST

## 2020-12-11 PROCEDURE — 97140 MANUAL THERAPY 1/> REGIONS: CPT | Performed by: PHYSICAL THERAPIST

## 2020-12-11 PROCEDURE — 99213 OFFICE O/P EST LOW 20 MIN: CPT | Performed by: FAMILY MEDICINE

## 2020-12-11 RX ORDER — ESCITALOPRAM OXALATE 10 MG/1
10 TABLET ORAL DAILY
Qty: 90 TABLET | Refills: 3 | Status: SHIPPED | OUTPATIENT
Start: 2020-12-11 | End: 2021-11-03 | Stop reason: SDUPTHER

## 2020-12-11 RX ORDER — ALPRAZOLAM 1 MG/1
1 TABLET ORAL 3 TIMES DAILY PRN
Qty: 270 TABLET | Refills: 1 | Status: SHIPPED | OUTPATIENT
Start: 2020-12-11 | End: 2021-03-09 | Stop reason: SDUPTHER

## 2020-12-11 NOTE — PROGRESS NOTES
Physical Therapy Daily Progress Note    Patient: Feliz Rueda   : 1955  Diagnosis/ICD-10 Code:  Acute strain of neck muscle, initial encounter [S16.1XXA]  Referring practitioner: Dajuan Thomas MD  Date of Initial Visit: Type: THERAPY  Noted: 10/21/2020  Today's Date: 2020  Patient seen for 14 sessions         Feliz Rueda reports:  Was able to do some yardwork this week without increase in pain in cervical spine.      Objective   See Exercise, Manual, and Modality Logs for complete treatment.       Assessment/Plan    Noted continued limited left cervical rotation.  Patient responds well to MET.  Progressing well with PROM.  Patient educated to continue increased activity level with ADL's as tolerated.     Progress per Plan of Care           Manual Therapy:    25     mins  82565;  Therapeutic Exercise:         mins  17814;     Neuromuscular Mackenzie:        mins  85245;    Therapeutic Activity:          mins  86780;     Gait Training:           mins  70126;     Ultrasound:          mins  46727;    Electrical Stimulation:    15     mins  31520 ( );  Dry Needling          mins self-pay    Timed Treatment:   25   mins   Total Treatment:     40   mins    Jose David Chapin PT  Physical Therapist
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
## 2020-12-12 PROBLEM — M54.2 NECK PAIN: Status: ACTIVE | Noted: 2020-12-12

## 2020-12-12 NOTE — ASSESSMENT & PLAN NOTE
I think the patient should try Lexapro 10 mg p.o. every morning with food.  Follow-up as needed, he can continue his as needed Xanax

## 2020-12-12 NOTE — ASSESSMENT & PLAN NOTE
MRI confirmed a protruding, extruding disc at C6-C7 pushing on the right neuroforamina.  Surgery is evaluated he is not to undergo any type of surgical treatment.  He is undergoing physical therapy and pain management which seems to be helping.  He can follow-up with me as needed

## 2020-12-12 NOTE — ASSESSMENT & PLAN NOTE
These have improved remarkably.  He is not really having much in the way of complaints of pain here now.

## 2020-12-14 ENCOUNTER — TREATMENT (OUTPATIENT)
Dept: PHYSICAL THERAPY | Facility: CLINIC | Age: 65
End: 2020-12-14

## 2020-12-14 DIAGNOSIS — S16.1XXA ACUTE STRAIN OF NECK MUSCLE, INITIAL ENCOUNTER: Primary | ICD-10-CM

## 2020-12-14 PROCEDURE — 97110 THERAPEUTIC EXERCISES: CPT | Performed by: PHYSICAL THERAPIST

## 2020-12-14 PROCEDURE — 97014 ELECTRIC STIMULATION THERAPY: CPT | Performed by: PHYSICAL THERAPIST

## 2020-12-14 NOTE — PROGRESS NOTES
Physical Therapy Daily Progress Note    Patient: Feliz Rueda   : 1955  Diagnosis/ICD-10 Code:  Acute strain of neck muscle, initial encounter [S16.1XXA]  Referring practitioner: Dajuan Thomas MD  Date of Initial Visit: Type: THERAPY  Noted: 10/21/2020  Today's Date: 2020  Patient seen for 15 sessions         Feliz Rueda reports: Feeling better.  No new complaints this visit.    Objective   See Exercise, Manual, and Modality Logs for complete treatment.       Assessment/Plan     Manual therapy tolerated well.   Reports TTP in suboccipitals with visit.  Progressing well with AROM in cervical spine with decreased pain level.    Progress per Plan of Care           Manual Therapy:    30     mins  62228;  Therapeutic Exercise:         mins  07244;     Neuromuscular Mackenzie:        mins  44890;    Therapeutic Activity:          mins  81294;     Gait Training:           mins  23546;     Ultrasound:          mins  23032;    Electrical Stimulation:    15     mins  60892 ( );  Dry Needling          mins self-pay    Timed Treatment:   30   mins   Total Treatment:     45   mins    Jose David Chapin PT  Physical Therapist

## 2020-12-16 ENCOUNTER — TELEPHONE (OUTPATIENT)
Dept: PHYSICAL THERAPY | Facility: CLINIC | Age: 65
End: 2020-12-16

## 2020-12-18 ENCOUNTER — TREATMENT (OUTPATIENT)
Dept: PHYSICAL THERAPY | Facility: CLINIC | Age: 65
End: 2020-12-18

## 2020-12-18 DIAGNOSIS — S16.1XXA ACUTE STRAIN OF NECK MUSCLE, INITIAL ENCOUNTER: Primary | ICD-10-CM

## 2020-12-18 PROCEDURE — 97014 ELECTRIC STIMULATION THERAPY: CPT | Performed by: PHYSICAL THERAPIST

## 2020-12-18 PROCEDURE — 97140 MANUAL THERAPY 1/> REGIONS: CPT | Performed by: PHYSICAL THERAPIST

## 2020-12-18 NOTE — PROGRESS NOTES
Physical Therapy Daily Progress Note    Patient: Feliz Rueda   : 1955  Diagnosis/ICD-10 Code:  Acute strain of neck muscle, initial encounter [S16.1XXA]  Referring practitioner: Dajuan Thomas MD  Date of Initial Visit: Type: THERAPY  Noted: 10/21/2020  Today's Date: 2020  Patient seen for 16 sessions         Feliz Rueda reports:  Had increased pain near base of cervical spine a few days ago.  Reports overall decrease in pain level.  Feeling better.    Objective   See Exercise, Manual, and Modality Logs for complete treatment.       Assessment/Plan     Tolerates modalities and manual therapy well.  Feeling well post-tx.      Progress per Plan of Care           Manual Therapy:    25     mins  84418;  Therapeutic Exercise:         mins  77055;     Neuromuscular Mackenzie:        mins  21297;    Therapeutic Activity:          mins  87134;     Gait Training:           mins  77679;     Ultrasound:          mins  72474;    Electrical Stimulation:    15     mins  97126 ( );  Dry Needling          mins self-pay    Timed Treatment:   25   mins   Total Treatment:     40   mins    Jose David Chapin PT  Physical Therapist

## 2021-01-07 ENCOUNTER — TREATMENT (OUTPATIENT)
Dept: PHYSICAL THERAPY | Facility: CLINIC | Age: 66
End: 2021-01-07

## 2021-01-07 DIAGNOSIS — S16.1XXA ACUTE STRAIN OF NECK MUSCLE, INITIAL ENCOUNTER: Primary | ICD-10-CM

## 2021-01-07 PROCEDURE — 97110 THERAPEUTIC EXERCISES: CPT | Performed by: PHYSICAL THERAPIST

## 2021-01-07 PROCEDURE — 97014 ELECTRIC STIMULATION THERAPY: CPT | Performed by: PHYSICAL THERAPIST

## 2021-01-07 PROCEDURE — 97140 MANUAL THERAPY 1/> REGIONS: CPT | Performed by: PHYSICAL THERAPIST

## 2021-01-07 NOTE — PROGRESS NOTES
Physical Therapy Daily Progress Note  Feliz Rueda   1955   Primary Diagnosis: Acute strain of neck muscle, initial encounter [S16.1XXA]   Type: THERAPY  Noted: 10/21/2020   1/7/2021   Visits: 17       Subjective   Pt reports: No new complaints vs last visit. HEP going well.      Objective     See Exercise, Manual, and Modality Logs for complete treatment.     Patient Education: HEP    Assessment/Plan Pain symptoms improved post rx. Fatigued easily.      Progress per Plan of Care            Timed:         Manual Therapy:    23     mins  80047;     Therapeutic Exercise:         mins  19499;     Neuromuscular Mackenzie:    8    mins  74720;    Therapeutic Activity:          mins  86798;     Gait Training:           mins  23889;     Ultrasound:          mins  81462;    Ionto                                   mins   60964  Self Care                            mins   15515    Un-Timed:  Electrical Stimulation:     15    mins  73235 ( );  Traction          mins 23166  Low Eval          Mins  66489  Mod Eval          Mins  01610  High Eval                            Mins  46025  Re-Eval                               mins  17558    Timed Treatment:   31   mins   Total Treatment:     46   mins    Ari Bazzi, PT, DPT, OCS  [place] license: [id number]  Physical Therapist

## 2021-01-15 ENCOUNTER — TREATMENT (OUTPATIENT)
Dept: PHYSICAL THERAPY | Facility: CLINIC | Age: 66
End: 2021-01-15

## 2021-01-15 DIAGNOSIS — S16.1XXA ACUTE STRAIN OF NECK MUSCLE, INITIAL ENCOUNTER: Primary | ICD-10-CM

## 2021-01-15 PROCEDURE — 97140 MANUAL THERAPY 1/> REGIONS: CPT | Performed by: PHYSICAL THERAPIST

## 2021-01-15 PROCEDURE — 97110 THERAPEUTIC EXERCISES: CPT | Performed by: PHYSICAL THERAPIST

## 2021-01-15 PROCEDURE — 97014 ELECTRIC STIMULATION THERAPY: CPT | Performed by: PHYSICAL THERAPIST

## 2021-01-15 NOTE — PROGRESS NOTES
Physical Therapy Daily Progress Note  Feliz Rueda   1955   Primary Diagnosis: Acute strain of neck muscle, initial encounter [S16.1XXA]   Type: THERAPY  Noted: 10/21/2020   1/15/2021   Visits: 18       Subjective   Pt reports: More UE pain today. Was able to do some lawn work but was very sore after. HEP going well.      Objective     See Exercise, Manual, and Modality Logs for complete treatment.     Patient Education: HEP    Assessment/Plan Symptoms improved post rx. Paracervical guarding.      Progress per Plan of Care            Timed:         Manual Therapy:    15     mins  23054;     Therapeutic Exercise:    8     mins  50868;     Neuromuscular Mackenzie:        mins  83173;    Therapeutic Activity:          mins  82213;     Gait Training:           mins  68304;     Ultrasound:          mins  19665;    Ionto                                   mins   18467  Self Care                            mins   00367    Un-Timed:  Electrical Stimulation:    15     mins  05146 ( );  Traction          mins 87540  Low Eval          Mins  74427  Mod Eval          Mins  20174  High Eval                            Mins  79684  Re-Eval                               mins  02890    Timed Treatment:   23   mins   Total Treatment:     38   mins    Ari Bazzi, PT, DPT, OCS  [place] license: [id number]  Physical Therapist

## 2021-01-21 ENCOUNTER — TREATMENT (OUTPATIENT)
Dept: PHYSICAL THERAPY | Facility: CLINIC | Age: 66
End: 2021-01-21

## 2021-01-21 DIAGNOSIS — S16.1XXA ACUTE STRAIN OF NECK MUSCLE, INITIAL ENCOUNTER: Primary | ICD-10-CM

## 2021-01-21 PROCEDURE — 97140 MANUAL THERAPY 1/> REGIONS: CPT | Performed by: PHYSICAL THERAPIST

## 2021-01-21 PROCEDURE — 97014 ELECTRIC STIMULATION THERAPY: CPT | Performed by: PHYSICAL THERAPIST

## 2021-01-21 PROCEDURE — 97110 THERAPEUTIC EXERCISES: CPT | Performed by: PHYSICAL THERAPIST

## 2021-01-21 NOTE — PROGRESS NOTES
Physical Therapy Daily Progress Note    VISIT#: 19    Subjective   Feliz Rueda reports that he is doing well today and feels that he is continuing to make progress since receiving his second injection along with physical therapy. Pt reports that he would not classify his impairment as pain, but rather numbness.  Pain Rating (0/10): 2    Objective     See Exercise, Manual, and Modality Logs for complete treatment.       Assessment/Plan   Pt continues to demonstrate improvements with decreased pain overall and decreased numbness noted. Pt progressed to greater amounts of exercises today with minimal to no increase in pain. Pt was instructed to modify exercises if increase in numbness noted.     Plan  Progress per Plan of Care and Progress strengthening /stabilization /functional activity            Timed:         Manual Therapy:    15     mins  41119;     Therapeutic Exercise:    15     mins  65491;     Neuromuscular Mackenzie:        mins  04031;    Therapeutic Activity:          mins  34916;     Gait Training:           mins  79729;     Ultrasound:          mins  47548;    Ionto                                   mins   72720  Self Care                            mins   97564    Un-Timed:  Electrical Stimulation:    15     mins  89366 ( );  Dry Needling          mins self-pay  Traction          mins 05568  Low Eval          Mins  01419  Mod Eval          Mins  23871  High Eval                            Mins  74732  Re-Eval                               mins  67307    Timed Treatment:   45   mins   Total Treatment:     45   mins    Vanesa Méndez PT, DPT  Physical Therapist

## 2021-01-25 ENCOUNTER — TREATMENT (OUTPATIENT)
Dept: PHYSICAL THERAPY | Facility: CLINIC | Age: 66
End: 2021-01-25

## 2021-01-25 DIAGNOSIS — S16.1XXA ACUTE STRAIN OF NECK MUSCLE, INITIAL ENCOUNTER: Primary | ICD-10-CM

## 2021-01-25 PROCEDURE — 97140 MANUAL THERAPY 1/> REGIONS: CPT | Performed by: PHYSICAL THERAPIST

## 2021-01-25 PROCEDURE — 97014 ELECTRIC STIMULATION THERAPY: CPT | Performed by: PHYSICAL THERAPIST

## 2021-01-25 NOTE — PROGRESS NOTES
Physical Therapy Daily Progress Note    Patient: Feliz Rueda   : 1955  Diagnosis/ICD-10 Code:  Acute strain of neck muscle, initial encounter [S16.1XXA]  Referring practitioner: Dajuan Thomas MD  Date of Initial Visit: Type: THERAPY  Noted: 10/21/2020  Today's Date: 2021  Patient seen for 20 sessions         Feliz Rueda reports: Feeling better overall.  Reports ability to complete household chores without neck pain.  Patient reports still occasional pain in cervical spine.     Objective   See Exercise, Manual, and Modality Logs for complete treatment.     Goals:   In three weeks, patient will report at least 25% reduction in pain level.  met  In three weeks, patient will demonstrate at least 25% improvement in cervical AROM.  Met                In six weeks, patient will report completing ADL's for 3 consecutive days without pain level over 1/10. met  In six weeks, patient will demonstrate cervical rotation of at least 60 degrees bilaterally. NM  In six weeks, patient will demonstrate compliance with I HEP. met  In six weeks, patient will demonstrate decreased perceived disability by decreasing score on NDI by at least 10%.NM    Assessment/Plan    Patient progressing well towards LTG's.  Noted continued limitation with left cervical rotation, flexion and extension.  Patient compliant with HEP.  Appropriate to continue PT intervention in order to progress towards goals.    Progress per Plan of Care           Manual Therapy:    30     mins  07414;  Therapeutic Exercise:         mins  99137;     Neuromuscular Mackenzie:        mins  38161;    Therapeutic Activity:          mins  66959;     Gait Training:           mins  65067;     Ultrasound:          mins  80888;    Electrical Stimulation:    15     mins  27829 ( );  Dry Needling          mins self-pay    Timed Treatment:   30   mins   Total Treatment:     45   mins    Jose David Chapin PT  Physical Therapist

## 2021-01-26 RX ORDER — TRAZODONE HYDROCHLORIDE 150 MG/1
TABLET ORAL
Qty: 90 TABLET | Refills: 3 | Status: SHIPPED | OUTPATIENT
Start: 2021-01-26 | End: 2022-02-23 | Stop reason: SDUPTHER

## 2021-01-27 ENCOUNTER — TREATMENT (OUTPATIENT)
Dept: PHYSICAL THERAPY | Facility: CLINIC | Age: 66
End: 2021-01-27

## 2021-01-27 DIAGNOSIS — S16.1XXA ACUTE STRAIN OF NECK MUSCLE, INITIAL ENCOUNTER: Primary | ICD-10-CM

## 2021-01-27 PROCEDURE — 97014 ELECTRIC STIMULATION THERAPY: CPT | Performed by: PHYSICAL THERAPIST

## 2021-01-27 PROCEDURE — 97140 MANUAL THERAPY 1/> REGIONS: CPT | Performed by: PHYSICAL THERAPIST

## 2021-01-27 NOTE — PROGRESS NOTES
Physical Therapy Daily Progress Note      Patient: Feliz Rueda   : 1955  Diagnosis/ICD-10 Code:  Acute strain of neck muscle, initial encounter [S16.1XXA]  Referring practitioner: Dajuan Thomas MD  Date of Initial Visit: Type: THERAPY  Noted: 10/21/2020  Today's Date: 2021  Patient seen for 21 sessions         Feliz Rueda reports:  Cervical spine feeling well today.  Soreness in neck and bilateral shoulder still present at times after increased activity level, however resolves quickly with rest.  Feeling better.    Objective   See Exercise, Manual, and Modality Logs for complete treatment.       Assessment/Plan     Tolerates manual therapy and modalities well.  Increased right sidebending and left rotation noted after contract/relax technique.  Progressing well.     Progress per Plan of Care           Manual Therapy:    25     mins  35624;  Therapeutic Exercise:         mins  18336;     Neuromuscular Mackenzie:        mins  47045;    Therapeutic Activity:          mins  97905;     Gait Training:           mins  63537;     Ultrasound:          mins  79730;    Electrical Stimulation:    15     mins  74202 ( );  Dry Needling          mins self-pay    Timed Treatment:   25   mins   Total Treatment:     40   mins    Jose David Chapin PT  Physical Therapist

## 2021-01-29 ENCOUNTER — TREATMENT (OUTPATIENT)
Dept: PHYSICAL THERAPY | Facility: CLINIC | Age: 66
End: 2021-01-29

## 2021-01-29 DIAGNOSIS — S16.1XXA ACUTE STRAIN OF NECK MUSCLE, INITIAL ENCOUNTER: Primary | ICD-10-CM

## 2021-01-29 PROCEDURE — 97140 MANUAL THERAPY 1/> REGIONS: CPT | Performed by: PHYSICAL THERAPIST

## 2021-01-29 PROCEDURE — 97014 ELECTRIC STIMULATION THERAPY: CPT | Performed by: PHYSICAL THERAPIST

## 2021-01-29 NOTE — PROGRESS NOTES
Physical Therapy Daily Progress Note        Patient: Feliz Rueda   : 1955  Diagnosis/ICD-10 Code:  Acute strain of neck muscle, initial encounter [S16.1XXA]  Referring practitioner: Dajuan Thomas MD  Date of Initial Visit: Type: THERAPY  Noted: 10/21/2020  Today's Date: 2021  Patient seen for 22 sessions         Feliz Rueda reports:  Feeling well today.  Reports not much activity this morning.     Objective   See Exercise, Manual, and Modality Logs for complete treatment.       Assessment/Plan    Continued restriction on right side of cervical spine with sidebending and rotation.  Noted improved PROM after manual therapy.  Progressing well.     Progress per Plan of Care           Manual Therapy:    30     mins  58126;  Therapeutic Exercise:         mins  89930;     Neuromuscular Mackenzie:        mins  99899;    Therapeutic Activity:          mins  82109;     Gait Training:           mins  92524;     Ultrasound:          mins  53589;    Electrical Stimulation:    15     mins  17898 ( );  Dry Needling          mins self-pay    Timed Treatment:   30   mins   Total Treatment:     45   mins    Jose David Chapin PT  Physical Therapist

## 2021-02-04 ENCOUNTER — TREATMENT (OUTPATIENT)
Dept: PHYSICAL THERAPY | Facility: CLINIC | Age: 66
End: 2021-02-04

## 2021-02-04 DIAGNOSIS — S16.1XXA ACUTE STRAIN OF NECK MUSCLE, INITIAL ENCOUNTER: Primary | ICD-10-CM

## 2021-02-04 PROCEDURE — 97014 ELECTRIC STIMULATION THERAPY: CPT | Performed by: PHYSICAL THERAPIST

## 2021-02-04 PROCEDURE — 97140 MANUAL THERAPY 1/> REGIONS: CPT | Performed by: PHYSICAL THERAPIST

## 2021-02-04 NOTE — PROGRESS NOTES
Physical Therapy Daily Progress Note      Patient: Feliz Rueda   : 1955  Diagnosis/ICD-10 Code:  Acute strain of neck muscle, initial encounter [S16.1XXA]  Referring practitioner: Dajuan Thomas MD  Date of Initial Visit: Type: THERAPY  Noted: 10/21/2020  Today's Date: 2021  Patient seen for 23 sessions         Feliz Rueda reports: Saw MD this week.  Reports that he continues to have general soreness in cervical spine, as well as tingling down right arm when sitting up straight.  Reports that he is going to have another pain injection within there next few weeks.     Objective   See Exercise, Manual, and Modality Logs for complete treatment.       Assessment/Plan     Patient tolerates today's treatment well.  Patient to continue with I HEP for a few weeks and then return to reassessment.     Progress per Plan of Care           Manual Therapy:    25     mins  25264;  Therapeutic Exercise:         mins  15327;     Neuromuscular Mackenzie:        mins  68356;    Therapeutic Activity:          mins  52589;     Gait Training:           mins  81816;     Ultrasound:          mins  11328;    Electrical Stimulation:    15     mins  78699 ( );  Dry Needling          mins self-pay    Timed Treatment:   25   mins   Total Treatment:     40   mins    Jose David Chapin PT  Physical Therapist

## 2021-02-12 ENCOUNTER — OFFICE VISIT (OUTPATIENT)
Dept: FAMILY MEDICINE CLINIC | Facility: CLINIC | Age: 66
End: 2021-02-12

## 2021-02-12 VITALS
HEART RATE: 93 BPM | BODY MASS INDEX: 24.11 KG/M2 | OXYGEN SATURATION: 98 % | TEMPERATURE: 97.3 F | SYSTOLIC BLOOD PRESSURE: 114 MMHG | HEIGHT: 72 IN | DIASTOLIC BLOOD PRESSURE: 78 MMHG | WEIGHT: 178 LBS

## 2021-02-12 DIAGNOSIS — K59.09 CHRONIC CONSTIPATION: ICD-10-CM

## 2021-02-12 DIAGNOSIS — F43.10 PTSD (POST-TRAUMATIC STRESS DISORDER): ICD-10-CM

## 2021-02-12 DIAGNOSIS — R06.09 DYSPNEA ON EXERTION: Primary | ICD-10-CM

## 2021-02-12 DIAGNOSIS — Z12.11 COLON CANCER SCREENING: ICD-10-CM

## 2021-02-12 PROCEDURE — 99214 OFFICE O/P EST MOD 30 MIN: CPT | Performed by: PHYSICIAN ASSISTANT

## 2021-02-12 RX ORDER — MULTIPLE VITAMINS W/ MINERALS TAB 9MG-400MCG
1 TAB ORAL DAILY
COMMUNITY
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
## 2021-02-16 ENCOUNTER — TELEPHONE (OUTPATIENT)
Dept: FAMILY MEDICINE CLINIC | Facility: CLINIC | Age: 66
End: 2021-02-16

## 2021-02-16 NOTE — TELEPHONE ENCOUNTER
Caller: Feliz Rueda    Relationship: Self    Best call back number: [phone]    What medication are you requesting: LINZESS    If a prescription is needed, what is your preferred pharmacy and phone number: [facility] - [address] [phone] [facility] [phone] FX     Additional notes:: PATIENT SAID HE HAD BEEN TAKING THIS AFTER BEING GIVEN IT BY KEDAR CYR, HE SAID IT HAS BEEN WORKING AND WOULD LIKE A 90 DAY SCRIPT SENT TO [facility]

## 2021-02-16 NOTE — TELEPHONE ENCOUNTER
Levi, I looked through chart. I see the issue on LOV but I dont see what mg you gave him so I could not load it

## 2021-02-19 ENCOUNTER — OFFICE VISIT (OUTPATIENT)
Dept: FAMILY MEDICINE CLINIC | Facility: CLINIC | Age: 66
End: 2021-02-19

## 2021-02-19 ENCOUNTER — LAB (OUTPATIENT)
Dept: FAMILY MEDICINE CLINIC | Facility: CLINIC | Age: 66
End: 2021-02-19

## 2021-02-19 VITALS
SYSTOLIC BLOOD PRESSURE: 112 MMHG | WEIGHT: 180.2 LBS | HEART RATE: 90 BPM | TEMPERATURE: 97.1 F | HEIGHT: 72 IN | BODY MASS INDEX: 24.41 KG/M2 | DIASTOLIC BLOOD PRESSURE: 82 MMHG | OXYGEN SATURATION: 97 %

## 2021-02-19 DIAGNOSIS — K58.1 IRRITABLE BOWEL SYNDROME WITH CONSTIPATION: ICD-10-CM

## 2021-02-19 DIAGNOSIS — R59.0 LYMPHADENOPATHY, CERVICAL: Primary | ICD-10-CM

## 2021-02-19 DIAGNOSIS — R59.0 LYMPHADENOPATHY, CERVICAL: ICD-10-CM

## 2021-02-19 PROBLEM — K59.09 CHRONIC CONSTIPATION: Status: RESOLVED | Noted: 2021-02-12 | Resolved: 2021-02-19

## 2021-02-19 PROCEDURE — 99214 OFFICE O/P EST MOD 30 MIN: CPT | Performed by: PHYSICIAN ASSISTANT

## 2021-02-19 PROCEDURE — 36415 COLL VENOUS BLD VENIPUNCTURE: CPT

## 2021-02-19 PROCEDURE — 85025 COMPLETE CBC W/AUTO DIFF WBC: CPT | Performed by: PHYSICIAN ASSISTANT

## 2021-02-19 NOTE — PROGRESS NOTES
"Subjective   Feliz Rueda is a 65 y.o. male.     Chief Complaint   Patient presents with   • Mass     2 lumps under chin - not sore to touch   • Sinusitis       /82 (BP Location: Left arm, Patient Position: Sitting, Cuff Size: Adult)   Pulse 90   Temp 97.1 °F (36.2 °C) (Skin)   Ht 182.9 cm (72\")   Wt 81.7 kg (180 lb 3.2 oz)   SpO2 97%   BMI 24.44 kg/m²     BP Readings from Last 3 Encounters:   02/19/21 112/82   02/12/21 114/78   12/11/20 121/78       Wt Readings from Last 3 Encounters:   02/19/21 81.7 kg (180 lb 3.2 oz)   02/12/21 80.7 kg (178 lb)   12/11/20 79.5 kg (175 lb 3.2 oz)       HPI Patient presents to the clinic for submandibular lymphadenopathy.  He states that this been present over the past few days.  He shaved and noticed the lumps.  They are nonpainful.  He denies sore throat, cough, fever, ear pain, dental pain.  Denies any skin infections.  Has not had lymphadenopathy in the past.  No unexpected weight loss or night sweats.  Patient's constipation is improved on the Linzess samples that I gave him at the last visit he would like a prescription for this.    The following portions of the patient's history were reviewed and updated as appropriate: allergies, current medications, past family history, past medical history, past social history, past surgical history and problem list.    Review of Systems   Constitutional: Negative for fatigue, fever, unexpected weight gain and unexpected weight loss.   HENT: Negative for facial swelling, rhinorrhea and sore throat.         Swollen lymph nodes   Respiratory: Negative for cough and shortness of breath.    Cardiovascular: Negative for chest pain.   Genitourinary: Negative for dysuria.   Musculoskeletal: Negative for back pain and neck pain.   Skin: Negative for rash and bruise.   Neurological: Negative for weakness and headache.   Psychiatric/Behavioral: Negative for depressed mood. The patient is not nervous/anxious.        Objective   Physical " Exam  Constitutional:       Appearance: Normal appearance.   HENT:      Head: Normocephalic.      Right Ear: Tympanic membrane normal. There is no impacted cerumen.      Left Ear: Tympanic membrane normal. There is no impacted cerumen.      Nose: Nose normal.      Mouth/Throat:      Pharynx: No oropharyngeal exudate.   Eyes:      Extraocular Movements: Extraocular movements intact.      Pupils: Pupils are equal, round, and reactive to light.   Neck:      Musculoskeletal: Normal range of motion.      Comments: There is possible enlargement of the submandibular lymph nodes bilaterally.  They are nontender  Cardiovascular:      Rate and Rhythm: Normal rate and regular rhythm.      Pulses: Normal pulses.      Heart sounds: No murmur.   Pulmonary:      Effort: Pulmonary effort is normal.      Breath sounds: No wheezing.   Neurological:      General: No focal deficit present.      Mental Status: He is alert and oriented to person, place, and time.   Psychiatric:         Mood and Affect: Mood normal.         Behavior: Behavior normal.           Diagnoses and all orders for this visit:    1. Lymphadenopathy, cervical (Primary)  -     CBC w AUTO Differential; Future  -     US Head Neck Soft Tissue; Future    2. Irritable bowel syndrome with constipation    Other orders  -     linaclotide (LINZESS) 72 MCG capsule capsule; Take 1 capsule by mouth Every Morning Before Breakfast.  Dispense: 30 capsule; Refill: 5    Continue Bipin is doing very well on this medication.  Discussed watchful waiting or lymphadenopathy but he would like to pursue work-up so we will check a CBC and ultrasound for the lymphadenopathy.    Return if symptoms worsen or fail to improve.

## 2021-02-20 LAB
BASOPHILS # BLD AUTO: 0.05 10*3/MM3 (ref 0–0.2)
BASOPHILS NFR BLD AUTO: 0.6 % (ref 0–1.5)
DEPRECATED RDW RBC AUTO: 41.6 FL (ref 37–54)
EOSINOPHIL # BLD AUTO: 0.07 10*3/MM3 (ref 0–0.4)
EOSINOPHIL NFR BLD AUTO: 0.9 % (ref 0.3–6.2)
ERYTHROCYTE [DISTWIDTH] IN BLOOD BY AUTOMATED COUNT: 11.7 % (ref 12.3–15.4)
HCT VFR BLD AUTO: 43.8 % (ref 37.5–51)
HGB BLD-MCNC: 14.4 G/DL (ref 13–17.7)
IMM GRANULOCYTES # BLD AUTO: 0.1 10*3/MM3 (ref 0–0.05)
IMM GRANULOCYTES NFR BLD AUTO: 1.3 % (ref 0–0.5)
LYMPHOCYTES # BLD AUTO: 1.99 10*3/MM3 (ref 0.7–3.1)
LYMPHOCYTES NFR BLD AUTO: 25.4 % (ref 19.6–45.3)
MCH RBC QN AUTO: 31.9 PG (ref 26.6–33)
MCHC RBC AUTO-ENTMCNC: 32.9 G/DL (ref 31.5–35.7)
MCV RBC AUTO: 97.1 FL (ref 79–97)
MONOCYTES # BLD AUTO: 0.62 10*3/MM3 (ref 0.1–0.9)
MONOCYTES NFR BLD AUTO: 7.9 % (ref 5–12)
NEUTROPHILS NFR BLD AUTO: 5.02 10*3/MM3 (ref 1.7–7)
NEUTROPHILS NFR BLD AUTO: 63.9 % (ref 42.7–76)
NRBC BLD AUTO-RTO: 0 /100 WBC (ref 0–0.2)
PLATELET # BLD AUTO: 186 10*3/MM3 (ref 140–450)
PMV BLD AUTO: 10 FL (ref 6–12)
RBC # BLD AUTO: 4.51 10*6/MM3 (ref 4.14–5.8)
WBC # BLD AUTO: 7.85 10*3/MM3 (ref 3.4–10.8)

## 2021-02-23 ENCOUNTER — TELEPHONE (OUTPATIENT)
Dept: FAMILY MEDICINE CLINIC | Facility: CLINIC | Age: 66
End: 2021-02-23

## 2021-02-23 NOTE — TELEPHONE ENCOUNTER
PATIENT IS CALLING IN HE STATES THAT INSURANCE WILL NOT PAY FOR THIS SCRIPT AND HE IS WANTING TO KNOW IF IT CAN BE RESENT WITH THE FOLLOWING CHANGES.    145 MCG 30 DAY  SUPPLY       PLEASE ADVISE    CALLBACK NUMBER IS  [phone]       CONFIRMED PHARMACY  [facility] - [address] [phone] [facility] [phone] FX

## 2021-03-01 ENCOUNTER — TREATMENT (OUTPATIENT)
Dept: PHYSICAL THERAPY | Facility: CLINIC | Age: 66
End: 2021-03-01

## 2021-03-01 DIAGNOSIS — S16.1XXA ACUTE STRAIN OF NECK MUSCLE, INITIAL ENCOUNTER: Primary | ICD-10-CM

## 2021-03-01 PROCEDURE — 97140 MANUAL THERAPY 1/> REGIONS: CPT | Performed by: PHYSICAL THERAPIST

## 2021-03-01 PROCEDURE — 97014 ELECTRIC STIMULATION THERAPY: CPT | Performed by: PHYSICAL THERAPIST

## 2021-03-01 NOTE — PROGRESS NOTES
Physical Therapy Daily Progress Note    Patient: Feliz Rueda   : 1955  Diagnosis/ICD-10 Code:  Acute strain of neck muscle, initial encounter [S16.1XXA]  Referring practitioner: Dajuan Thomas MD  Date of Initial Visit: Type: THERAPY  Noted: 10/21/2020  Today's Date: 3/1/2021  Patient seen for 24 sessions         Feliz Rueda reports:  Increased neck pain over the past few weeks, as well as pain down right arm reported today.  Reports getting injection last week without relief.     Objective   See Exercise, Manual, and Modality Logs for complete treatment.       Assessment/Plan     Reported decrease in pain level at end of today's treatment.  Noted deficits in cervical AROM in all planes (left rotation most limited).  Tolerates today's treatment well.     Progress per Plan of Care           Manual Therapy:    25     mins  92671;  Therapeutic Exercise:         mins  14199;     Neuromuscular Mackenzie:        mins  11228;    Therapeutic Activity:          mins  56999;     Gait Training:           mins  37495;     Ultrasound:          mins  33001;    Electrical Stimulation:    15     mins  70918 ( );  Dry Needling          mins self-pay    Timed Treatment:   25   mins   Total Treatment:     40   mins    Jose David Chapin PT  Physical Therapist

## 2021-03-02 ENCOUNTER — HOSPITAL ENCOUNTER (OUTPATIENT)
Dept: CARDIOLOGY | Facility: HOSPITAL | Age: 66
Discharge: HOME | End: 2021-03-02
Admitting: PHYSICIAN ASSISTANT

## 2021-03-02 ENCOUNTER — TELEPHONE (OUTPATIENT)
Dept: FAMILY MEDICINE CLINIC | Facility: CLINIC | Age: 66
End: 2021-03-02

## 2021-03-02 VITALS
HEIGHT: 72 IN | WEIGHT: 180 LBS | DIASTOLIC BLOOD PRESSURE: 80 MMHG | HEART RATE: 72 BPM | SYSTOLIC BLOOD PRESSURE: 116 MMHG | BODY MASS INDEX: 24.38 KG/M2

## 2021-03-02 DIAGNOSIS — R06.09 DYSPNEA ON EXERTION: ICD-10-CM

## 2021-03-02 LAB
BH CV ECHO MEAS - ACS: 2.1 CM
BH CV ECHO MEAS - AO MAX PG (FULL): 2.5 MMHG
BH CV ECHO MEAS - AO MAX PG: 7.6 MMHG
BH CV ECHO MEAS - AO MEAN PG (FULL): 2.1 MMHG
BH CV ECHO MEAS - AO MEAN PG: 4.6 MMHG
BH CV ECHO MEAS - AO ROOT AREA (BSA CORRECTED): 1.7
BH CV ECHO MEAS - AO ROOT AREA: 9.3 CM^2
BH CV ECHO MEAS - AO ROOT DIAM: 3.4 CM
BH CV ECHO MEAS - AO V2 MAX: 137.8 CM/SEC
BH CV ECHO MEAS - AO V2 MEAN: 102.4 CM/SEC
BH CV ECHO MEAS - AO V2 VTI: 35.4 CM
BH CV ECHO MEAS - ASC AORTA: 4.2 CM
BH CV ECHO MEAS - AVA(I,A): 2.6 CM^2
BH CV ECHO MEAS - AVA(I,D): 2.6 CM^2
BH CV ECHO MEAS - AVA(V,A): 2.6 CM^2
BH CV ECHO MEAS - AVA(V,D): 2.6 CM^2
BH CV ECHO MEAS - BSA(HAYCOCK): 2 M^2
BH CV ECHO MEAS - BSA: 2 M^2
BH CV ECHO MEAS - BZI_BMI: 24.4 KILOGRAMS/M^2
BH CV ECHO MEAS - BZI_METRIC_HEIGHT: 182.9 CM
BH CV ECHO MEAS - BZI_METRIC_WEIGHT: 81.6 KG
BH CV ECHO MEAS - EDV(CUBED): 68.9 ML
BH CV ECHO MEAS - EDV(MOD-SP2): 73.9 ML
BH CV ECHO MEAS - EDV(MOD-SP4): 60.1 ML
BH CV ECHO MEAS - EDV(TEICH): 74.2 ML
BH CV ECHO MEAS - EF(CUBED): 71.9 %
BH CV ECHO MEAS - EF(MOD-BP): 60 %
BH CV ECHO MEAS - EF(MOD-SP2): 66.4 %
BH CV ECHO MEAS - EF(MOD-SP4): 51.4 %
BH CV ECHO MEAS - EF(TEICH): 64.1 %
BH CV ECHO MEAS - ESV(CUBED): 19.4 ML
BH CV ECHO MEAS - ESV(MOD-SP2): 24.8 ML
BH CV ECHO MEAS - ESV(MOD-SP4): 29.2 ML
BH CV ECHO MEAS - ESV(TEICH): 26.7 ML
BH CV ECHO MEAS - FS: 34.5 %
BH CV ECHO MEAS - IVS/LVPW: 1
BH CV ECHO MEAS - IVSD: 1.6 CM
BH CV ECHO MEAS - LA DIMENSION(2D): 3.4 CM
BH CV ECHO MEAS - LA DIMENSION: 3.8 CM
BH CV ECHO MEAS - LA/AO: 1.1
BH CV ECHO MEAS - LV DIASTOLIC VOL/BSA (35-75): 29.5 ML/M^2
BH CV ECHO MEAS - LV MASS(C)D: 260.3 GRAMS
BH CV ECHO MEAS - LV MASS(C)DI: 127.8 GRAMS/M^2
BH CV ECHO MEAS - LV MAX PG: 5.1 MMHG
BH CV ECHO MEAS - LV MEAN PG: 2.5 MMHG
BH CV ECHO MEAS - LV SYSTOLIC VOL/BSA (12-30): 14.3 ML/M^2
BH CV ECHO MEAS - LV V1 MAX: 112.4 CM/SEC
BH CV ECHO MEAS - LV V1 MEAN: 71.8 CM/SEC
BH CV ECHO MEAS - LV V1 VTI: 28.3 CM
BH CV ECHO MEAS - LVIDD: 4.1 CM
BH CV ECHO MEAS - LVIDS: 2.7 CM
BH CV ECHO MEAS - LVOT AREA: 3.2 CM^2
BH CV ECHO MEAS - LVOT DIAM: 2 CM
BH CV ECHO MEAS - LVPWD: 1.6 CM
BH CV ECHO MEAS - MR MAX PG: 89.3 MMHG
BH CV ECHO MEAS - MR MAX VEL: 472.5 CM/SEC
BH CV ECHO MEAS - MV A MAX VEL: 74 CM/SEC
BH CV ECHO MEAS - MV DEC SLOPE: 399.5 CM/SEC^2
BH CV ECHO MEAS - MV DEC TIME: 0.21 SEC
BH CV ECHO MEAS - MV E MAX VEL: 85.8 CM/SEC
BH CV ECHO MEAS - MV E/A: 1.2
BH CV ECHO MEAS - MV MAX PG: 2.9 MMHG
BH CV ECHO MEAS - MV MEAN PG: 1.2 MMHG
BH CV ECHO MEAS - MV V2 MAX: 84.7 CM/SEC
BH CV ECHO MEAS - MV V2 MEAN: 49.9 CM/SEC
BH CV ECHO MEAS - MV V2 VTI: 33.2 CM
BH CV ECHO MEAS - MVA(VTI): 2.8 CM^2
BH CV ECHO MEAS - PA ACC TIME: 0.19 SEC
BH CV ECHO MEAS - PA MAX PG (FULL): 1.3 MMHG
BH CV ECHO MEAS - PA MAX PG: 3.7 MMHG
BH CV ECHO MEAS - PA MEAN PG (FULL): 1.3 MMHG
BH CV ECHO MEAS - PA MEAN PG: 2.5 MMHG
BH CV ECHO MEAS - PA PR(ACCEL): -8.2 MMHG
BH CV ECHO MEAS - PA V2 MAX: 95.6 CM/SEC
BH CV ECHO MEAS - PA V2 MEAN: 77.7 CM/SEC
BH CV ECHO MEAS - PA V2 VTI: 32.3 CM
BH CV ECHO MEAS - PULM DIAS VEL: 45.6 CM/SEC
BH CV ECHO MEAS - PULM S/D: 1.5
BH CV ECHO MEAS - PULM SYS VEL: 67 CM/SEC
BH CV ECHO MEAS - PVA(I,A): 4 CM^2
BH CV ECHO MEAS - PVA(I,D): 4 CM^2
BH CV ECHO MEAS - PVA(V,A): 5.1 CM^2
BH CV ECHO MEAS - PVA(V,D): 5.1 CM^2
BH CV ECHO MEAS - QP/QS: 1.4
BH CV ECHO MEAS - RV MAX PG: 2.3 MMHG
BH CV ECHO MEAS - RV MEAN PG: 1.2 MMHG
BH CV ECHO MEAS - RV V1 MAX: 76.4 CM/SEC
BH CV ECHO MEAS - RV V1 MEAN: 51.2 CM/SEC
BH CV ECHO MEAS - RV V1 VTI: 20.4 CM
BH CV ECHO MEAS - RVDD: 2.4 CM
BH CV ECHO MEAS - RVOT AREA: 6.3 CM^2
BH CV ECHO MEAS - RVOT DIAM: 2.8 CM
BH CV ECHO MEAS - SI(AO): 162.6 ML/M^2
BH CV ECHO MEAS - SI(CUBED): 24.3 ML/M^2
BH CV ECHO MEAS - SI(LVOT): 45.1 ML/M^2
BH CV ECHO MEAS - SI(MOD-SP2): 24.1 ML/M^2
BH CV ECHO MEAS - SI(MOD-SP4): 15.2 ML/M^2
BH CV ECHO MEAS - SI(TEICH): 23.4 ML/M^2
BH CV ECHO MEAS - SV(AO): 331.2 ML
BH CV ECHO MEAS - SV(CUBED): 49.6 ML
BH CV ECHO MEAS - SV(LVOT): 91.9 ML
BH CV ECHO MEAS - SV(MOD-SP2): 49.1 ML
BH CV ECHO MEAS - SV(MOD-SP4): 30.9 ML
BH CV ECHO MEAS - SV(RVOT): 129.4 ML
BH CV ECHO MEAS - SV(TEICH): 47.6 ML
BH CV STRESS BP STAGE 1: NORMAL
BH CV STRESS BP STAGE 2: NORMAL
BH CV STRESS BP STAGE 3: NORMAL
BH CV STRESS DURATION MIN STAGE 1: 3
BH CV STRESS DURATION MIN STAGE 2: 3
BH CV STRESS DURATION MIN STAGE 3: 3
BH CV STRESS DURATION MIN STAGE 4: 0
BH CV STRESS DURATION SEC STAGE 1: 0
BH CV STRESS DURATION SEC STAGE 2: 0
BH CV STRESS DURATION SEC STAGE 3: 0
BH CV STRESS DURATION SEC STAGE 4: 54
BH CV STRESS ECHO POST STRESS EJECTION FRACTION EF: 75 %
BH CV STRESS GRADE STAGE 1: 10
BH CV STRESS GRADE STAGE 2: 12
BH CV STRESS GRADE STAGE 3: 14
BH CV STRESS GRADE STAGE 4: 16
BH CV STRESS HR STAGE 1: 102
BH CV STRESS METS STAGE 1: 5
BH CV STRESS METS STAGE 2: 7.5
BH CV STRESS METS STAGE 3: 10
BH CV STRESS METS STAGE 4: 13.5
BH CV STRESS PROTOCOL 1: NORMAL
BH CV STRESS RECOVERY BP: NORMAL MMHG
BH CV STRESS RECOVERY HR: 76 BPM
BH CV STRESS SPEED STAGE 1: 1.7
BH CV STRESS SPEED STAGE 2: 2.5
BH CV STRESS SPEED STAGE 3: 3.4
BH CV STRESS SPEED STAGE 4: 4.2
BH CV STRESS STAGE 1: 1
BH CV STRESS STAGE 2: 2
BH CV STRESS STAGE 3: 3
BH CV STRESS STAGE 4: 4
MAXIMAL PREDICTED HEART RATE: 156 BPM
STRESS BASELINE BP: NORMAL MMHG
STRESS BASELINE HR: 77 BPM
STRESS POST ESTIMATED WORKLOAD: 12.8 METS
STRESS POST EXERCISE DUR MIN: 9 MIN
STRESS POST EXERCISE DUR SEC: 54 SEC
STRESS POST PEAK BP: NORMAL MMHG
STRESS TARGET HR: 133 BPM

## 2021-03-02 PROCEDURE — 93320 DOPPLER ECHO COMPLETE: CPT | Performed by: INTERNAL MEDICINE

## 2021-03-02 PROCEDURE — 93350 STRESS TTE ONLY: CPT

## 2021-03-02 PROCEDURE — 93018 CV STRESS TEST I&R ONLY: CPT | Performed by: INTERNAL MEDICINE

## 2021-03-02 PROCEDURE — 93320 DOPPLER ECHO COMPLETE: CPT

## 2021-03-02 PROCEDURE — 93350 STRESS TTE ONLY: CPT | Performed by: INTERNAL MEDICINE

## 2021-03-02 PROCEDURE — 25010000002 SULFUR HEXAFLUORIDE MICROSPH 60.7-25 MG RECONSTITUTED SUSPENSION: Performed by: PHYSICIAN ASSISTANT

## 2021-03-02 PROCEDURE — 93325 DOPPLER ECHO COLOR FLOW MAPG: CPT | Performed by: INTERNAL MEDICINE

## 2021-03-02 PROCEDURE — 93325 DOPPLER ECHO COLOR FLOW MAPG: CPT

## 2021-03-02 PROCEDURE — 93352 ADMIN ECG CONTRAST AGENT: CPT | Performed by: INTERNAL MEDICINE

## 2021-03-02 PROCEDURE — 93017 CV STRESS TEST TRACING ONLY: CPT

## 2021-03-02 RX ORDER — FENOFIBRATE 160 MG/1
160 TABLET ORAL DAILY
Qty: 90 TABLET | Refills: 4 | Status: SHIPPED | OUTPATIENT
Start: 2021-03-02 | End: 2022-03-17

## 2021-03-02 RX ADMIN — SULFUR HEXAFLUORIDE 2 ML: KIT at 12:00

## 2021-03-02 NOTE — TELEPHONE ENCOUNTER
Caller: Feliz Rueda    Relationship: Self    Best call back number: [phone]       What orders are you requesting (i.e. lab or imaging): ULTRASOUND OF   NECK     In what timeframe would the patient need to come in: NEXT AVAILABLE     Where will you receive your lab/imaging services: [facility]       Additional notes:       MR. RUEDA SAYS SHE WAS SEEN ON 2/112/20201 REGARDING LUMP ON NECK, HE SAYS HE WAS TO BE SCHEDULED FOR ULTRASOUND , HAS NOT HEARD ANYTHING.       PLEASE ADVISE

## 2021-03-02 NOTE — TELEPHONE ENCOUNTER
Caller: Feliz Rueda JOSE    Relationship: Self    Best call back number: [phone]     Medication needed:   Requested Prescriptions     Pending Prescriptions Disp Refills   • fenofibrate 160 MG tablet 90 tablet 4     Sig: Take 1 tablet by mouth Daily.       When do you need the refill by: TODAY    What details did the patient provide when requesting the medication:     1 WEEK OF MEDICATION REMAINING  REQUESTING 90 DAY SUPPLY    Does the patient have less than a 3 day supply:  [] Yes  [x] No    What is the patient's preferred pharmacy:      [facility] - [place] [phone] [facility] [phone] [address]

## 2021-03-03 ENCOUNTER — TELEPHONE (OUTPATIENT)
Dept: FAMILY MEDICINE CLINIC | Facility: CLINIC | Age: 66
End: 2021-03-03

## 2021-03-03 NOTE — TELEPHONE ENCOUNTER
Caller: Feliz Rueda    Relationship: Self    Best call back number: [phone]     What medication are you requesting:ANTIBIOTIC     What are your current symptoms: LUMPS IN NECK     How long have you been experiencing symptoms: SEEN ON 2/19/2021      Have you had these symptoms before:    [x] Yes  [] No    Have you been treated for these symptoms before:   [x] Yes  [] No    If a prescription is needed, what is your preferred pharmacy and phone number:    [id number] - [place], [place] - [address] AT [facility] [address] - [phone] PH - [phone] FX  [phone]  Additional notes:

## 2021-03-09 DIAGNOSIS — F43.10 PTSD (POST-TRAUMATIC STRESS DISORDER): Primary | ICD-10-CM

## 2021-03-09 RX ORDER — ALPRAZOLAM 1 MG/1
1 TABLET ORAL 3 TIMES DAILY PRN
Qty: 270 TABLET | Refills: 1 | Status: SHIPPED | OUTPATIENT
Start: 2021-03-09 | End: 2021-08-19

## 2021-03-09 NOTE — TELEPHONE ENCOUNTER
.Caller: Feliz Rueda JOSE    Relationship: Self    Best call back number:[phone]    Medication needed:   Requested Prescriptions     Pending Prescriptions Disp Refills   • ALPRAZolam (Xanax) 1 MG tablet 270 tablet 1     Sig: Take 1 tablet by mouth 3 (Three) Times a Day As Needed for Anxiety.       When do you need the refill by: 1 WEEK    Does the patient have less than a 3 day supply:  [] Yes  [x] No    What is the patient's preferred pharmacy: [facility] - [address] [phone] [facility] [phone]

## 2021-03-10 ENCOUNTER — TREATMENT (OUTPATIENT)
Dept: PHYSICAL THERAPY | Facility: CLINIC | Age: 66
End: 2021-03-10

## 2021-03-10 DIAGNOSIS — S16.1XXA ACUTE STRAIN OF NECK MUSCLE, INITIAL ENCOUNTER: Primary | ICD-10-CM

## 2021-03-10 PROCEDURE — 97014 ELECTRIC STIMULATION THERAPY: CPT | Performed by: PHYSICAL THERAPIST

## 2021-03-10 PROCEDURE — 97140 MANUAL THERAPY 1/> REGIONS: CPT | Performed by: PHYSICAL THERAPIST

## 2021-03-10 NOTE — PROGRESS NOTES
Re-Assessment / Re-Certification    Patient: Feliz Rueda   : 1955  Diagnosis/ICD-10 Code:  Acute strain of neck muscle, initial encounter [S16.1XXA]  Referring practitioner: Dajuan Thomas MD  Date of Initial Visit: Type: THERAPY  Noted: 10/21/2020  Today's Date: 3/10/2021  Patient seen for 25 sessions      Subjective:   Feliz Rueda reports:  Continues to experience pain/tingling into left arm.  Also reports pain in cervical spine with excessive movements.  Feels that he is about 50% improved since starting PT intervention.  Subjective Questionnaire: NDI:24%  Clinical Progress: improved  Home Program Compliance: Yes  Treatment has included: therapeutic exercise, manual therapy, electrical stimulation and moist heat      Assessment/Plan  Progress toward previous goals: Partially Met     Goals:   In three weeks, patient will report at least 25% reduction in pain level. met  In three weeks, patient will demonstrate at least 25% improvement in cervical AROM. met    In six weeks, patient will report completing ADL's for 3 consecutive days without pain level over 1/10. NM  In six weeks, patient will demonstrate cervical rotation of at least 60 degrees bilaterally. NM  In six weeks, patient will demonstrate compliance with I HEP.  met  In six weeks, patient will demonstrate decreased perceived disability by decreasing score on NDI by at least 10%. NM    New Goals  Short-term goals (STG): In three weeks, patient will demonstrate at least 50% improvement in cervical AROM.   Long-term goals (LTG): In six weeks, patient will report completing all ADL's without pain level over 1/10 for at least 3 days.      Recommendations: Continue as planned  Timeframe: 6 weeks  Prognosis to achieve goals: good    PT Signature: Jose David Chapin PT      Based upon review of the patient's progress and continued therapy plan, it is my medical opinion that Feliz Rueda should continue physical therapy treatment at [facility]ER  [facility]YSICAL [facility]  [address] [place] [address] [phone].    Signature: __________________________________  Dajuan Thomas MD    Manual Therapy:    25     mins  69289;  Therapeutic Exercise:         mins  58072;     Neuromuscular Mackenzie:        mins  21602;    Therapeutic Activity:          mins  11016;     Gait Training:           mins  13944;     Ultrasound:          mins  38869;    Electrical Stimulation:    15     mins  55812 ( );  Dry Needling          mins self-pay    Timed Treatment:   25   mins   Total Treatment:     40   mins

## 2021-03-17 ENCOUNTER — TREATMENT (OUTPATIENT)
Dept: PHYSICAL THERAPY | Facility: CLINIC | Age: 66
End: 2021-03-17

## 2021-03-17 DIAGNOSIS — S16.1XXA ACUTE STRAIN OF NECK MUSCLE, INITIAL ENCOUNTER: Primary | ICD-10-CM

## 2021-03-17 PROCEDURE — 97140 MANUAL THERAPY 1/> REGIONS: CPT | Performed by: PHYSICAL THERAPIST

## 2021-03-17 PROCEDURE — 97014 ELECTRIC STIMULATION THERAPY: CPT | Performed by: PHYSICAL THERAPIST

## 2021-03-17 NOTE — PROGRESS NOTES
Physical Therapy Daily Progress Note    Patient: Feliz Rueda   : 1955  Diagnosis/ICD-10 Code:  Acute strain of neck muscle, initial encounter [S16.1XXA]  Referring practitioner: Self Referring  Date of Initial Visit: Type: THERAPY  Noted: 10/21/2020  Today's Date: 3/17/2021  Patient seen for 26 sessions         Feliz Rueda reports:  Patient reports continued pain in cervical spine and left UE today.  No new complaints.     Objective   See Exercise, Manual, and Modality Logs for complete treatment.       Assessment/Plan    Tolerates manual therapy and modalities well.  Noted improvement of left cervical rotation after muscle energy.  Progressing well.     Progress per Plan of Care           Manual Therapy:    25     mins  60154;  Therapeutic Exercise:         mins  00132;     Neuromuscular Mackenzie:        mins  51559;    Therapeutic Activity:          mins  38204;     Gait Training:           mins  45426;     Ultrasound:          mins  00572;    Electrical Stimulation:    15     mins  25216 ( );  Dry Needling          mins self-pay    Timed Treatment:   25   mins   Total Treatment:     40   mins    Jose David Chapin PT  Physical Therapist

## 2021-03-22 ENCOUNTER — TREATMENT (OUTPATIENT)
Dept: PHYSICAL THERAPY | Facility: CLINIC | Age: 66
End: 2021-03-22

## 2021-03-22 DIAGNOSIS — S16.1XXA ACUTE STRAIN OF NECK MUSCLE, INITIAL ENCOUNTER: Primary | ICD-10-CM

## 2021-03-22 PROCEDURE — 97140 MANUAL THERAPY 1/> REGIONS: CPT | Performed by: PHYSICAL THERAPIST

## 2021-03-22 NOTE — PROGRESS NOTES
Physical Therapy Daily Progress Note    Patient: Feliz Rueda   : 1955  Diagnosis/ICD-10 Code:  Acute strain of neck muscle, initial encounter [S16.1XXA]  Referring practitioner: Self Referring  Date of Initial Visit: Type: THERAPY  Noted: 10/21/2020  Today's Date: 3/22/2021  Patient seen for 27 sessions         Feliz Rueda reports:  Neck feeling a little better over the weekend.  Continued feeling of tingling down RUE.     Objective   See Exercise, Manual, and Modality Logs for complete treatment.       Assessment/Plan     Tolerates manual therapy treatment well this visit.  Patient requests to skip modalities treatment due to being limited on time today.     Progress per Plan of Care           Manual Therapy:    30     mins  73841;  Therapeutic Exercise:         mins  85422;     Neuromuscular Mackenzie:        mins  76218;    Therapeutic Activity:          mins  73188;     Gait Training:           mins  62415;     Ultrasound:          mins  23623;    Electrical Stimulation:         mins  89298 ( );  Dry Needling          mins self-pay    Timed Treatment:   30   mins   Total Treatment:     30   mins    Jose David Chapin PT  Physical Therapist

## 2021-03-26 ENCOUNTER — TREATMENT (OUTPATIENT)
Dept: PHYSICAL THERAPY | Facility: CLINIC | Age: 66
End: 2021-03-26

## 2021-03-26 DIAGNOSIS — S16.1XXA ACUTE STRAIN OF NECK MUSCLE, INITIAL ENCOUNTER: Primary | ICD-10-CM

## 2021-03-26 PROCEDURE — 97140 MANUAL THERAPY 1/> REGIONS: CPT | Performed by: PHYSICAL THERAPIST

## 2021-03-26 NOTE — PROGRESS NOTES
Physical Therapy Daily Progress Note    Patient: Feliz Rueda   : 1955  Diagnosis/ICD-10 Code:  Acute strain of neck muscle, initial encounter [S16.1XXA]  Referring practitioner: Self Referring  Date of Initial Visit: Type: THERAPY  Noted: 10/21/2020  Today's Date: 3/26/2021  Patient seen for 28 sessions         Feliz Rueda reports:  Cervical spine feeling a little better.  Still sore near base of cervical spine.    Objective   See Exercise, Manual, and Modality Logs for complete treatment.       Assessment/Plan     Requests to hold modalities this visit.  Tolerates manual therapy well.  Noted hypomobility at lower cervical spine, as well as in thoracic spine.    Progress per Plan of Care           Manual Therapy:    38     mins  12294;  Therapeutic Exercise:         mins  02415;     Neuromuscular Mackenzie:        mins  51882;    Therapeutic Activity:          mins  50789;     Gait Training:           mins  63152;     Ultrasound:          mins  27146;    Electrical Stimulation:         mins  96704 ( );  Dry Needling          mins self-pay    Timed Treatment:   38   mins   Total Treatment:     38   mins    Jose David Chapin PT  Physical Therapist

## 2021-03-31 ENCOUNTER — TREATMENT (OUTPATIENT)
Dept: PHYSICAL THERAPY | Facility: CLINIC | Age: 66
End: 2021-03-31

## 2021-03-31 DIAGNOSIS — S16.1XXA ACUTE STRAIN OF NECK MUSCLE, INITIAL ENCOUNTER: Primary | ICD-10-CM

## 2021-03-31 PROCEDURE — 97140 MANUAL THERAPY 1/> REGIONS: CPT | Performed by: PHYSICAL THERAPIST

## 2021-03-31 PROCEDURE — 97014 ELECTRIC STIMULATION THERAPY: CPT | Performed by: PHYSICAL THERAPIST

## 2021-04-07 ENCOUNTER — TREATMENT (OUTPATIENT)
Dept: PHYSICAL THERAPY | Facility: CLINIC | Age: 66
End: 2021-04-07

## 2021-04-07 DIAGNOSIS — S16.1XXA ACUTE STRAIN OF NECK MUSCLE, INITIAL ENCOUNTER: Primary | ICD-10-CM

## 2021-04-07 PROCEDURE — 97110 THERAPEUTIC EXERCISES: CPT | Performed by: PHYSICAL THERAPIST

## 2021-04-07 PROCEDURE — 97140 MANUAL THERAPY 1/> REGIONS: CPT | Performed by: PHYSICAL THERAPIST

## 2021-04-07 NOTE — PROGRESS NOTES
Physical Therapy Daily Progress Note      Patient: Feliz Rueda   : 1955  Diagnosis/ICD-10 Code:  Acute strain of neck muscle, initial encounter [S16.1XXA]  Referring practitioner: Self Referring  Date of Initial Visit: Type: THERAPY  Noted: 10/21/2020  Today's Date: 2021  Patient seen for 30 sessions         Feliz Rueda reports: Decreased pain in cervical spine overall.  Reports decreased limitation with looking over his shoulders.  Reports that he has returned to most of his yard work activities and just monitors himself for symptoms.     Objective   See Exercise, Manual, and Modality Logs for complete treatment.       Assessment/Plan     Tolerates manual therapy well.  Patient reports decrease in pain level, improved AROM and compliance with HEP.  Will continue with I HEP at this time and return if symptoms increase.     Progress per Plan of Care           Manual Therapy:    25     mins  05812;  Therapeutic Exercise:    10     mins  41665;     Neuromuscular Mackenzie:        mins  74923;    Therapeutic Activity:          mins  02519;     Gait Training:           mins  47592;     Ultrasound:          mins  73952;    Electrical Stimulation:         mins  65985 ( );  Dry Needling          mins self-pay    Timed Treatment:   35   mins   Total Treatment:     35   mins    Jose David Chapin PT  Physical Therapist

## 2021-04-27 ENCOUNTER — ON CAMPUS - OUTPATIENT (AMBULATORY)
Dept: URBAN - METROPOLITAN AREA HOSPITAL 2 | Facility: HOSPITAL | Age: 66
End: 2021-04-27
Payer: COMMERCIAL

## 2021-04-27 VITALS
OXYGEN SATURATION: 95 % | WEIGHT: 181 LBS | OXYGEN SATURATION: 97 % | SYSTOLIC BLOOD PRESSURE: 133 MMHG | RESPIRATION RATE: 14 BRPM | DIASTOLIC BLOOD PRESSURE: 78 MMHG | HEART RATE: 57 BPM | DIASTOLIC BLOOD PRESSURE: 76 MMHG | SYSTOLIC BLOOD PRESSURE: 117 MMHG | HEART RATE: 54 BPM | RESPIRATION RATE: 15 BRPM | DIASTOLIC BLOOD PRESSURE: 77 MMHG | DIASTOLIC BLOOD PRESSURE: 68 MMHG | SYSTOLIC BLOOD PRESSURE: 109 MMHG | HEIGHT: 72 IN | SYSTOLIC BLOOD PRESSURE: 105 MMHG | SYSTOLIC BLOOD PRESSURE: 97 MMHG | HEART RATE: 60 BPM | SYSTOLIC BLOOD PRESSURE: 102 MMHG | DIASTOLIC BLOOD PRESSURE: 60 MMHG | HEART RATE: 67 BPM | SYSTOLIC BLOOD PRESSURE: 101 MMHG | HEART RATE: 56 BPM | HEART RATE: 59 BPM | TEMPERATURE: 97.7 F | SYSTOLIC BLOOD PRESSURE: 114 MMHG | DIASTOLIC BLOOD PRESSURE: 62 MMHG | OXYGEN SATURATION: 99 % | OXYGEN SATURATION: 98 % | DIASTOLIC BLOOD PRESSURE: 84 MMHG | SYSTOLIC BLOOD PRESSURE: 119 MMHG | RESPIRATION RATE: 16 BRPM

## 2021-04-27 DIAGNOSIS — Z86.010 PERSONAL HISTORY OF COLONIC POLYPS: ICD-10-CM

## 2021-04-27 DIAGNOSIS — K57.30 DIVERTICULOSIS OF LARGE INTESTINE WITHOUT PERFORATION OR ABS: ICD-10-CM

## 2021-04-27 PROCEDURE — 45378 DIAGNOSTIC COLONOSCOPY: CPT | Performed by: INTERNAL MEDICINE

## 2021-06-18 NOTE — PROGRESS NOTES
Physical Therapy Daily Progress Note    Patient: Feliz Rueda   : 1955  Diagnosis/ICD-10 Code:  Acute strain of neck muscle, initial encounter [S16.1XXA]  Referring practitioner: Dajuan Thomas MD  Date of Initial Visit: Type: THERAPY  Noted: 10/21/2020  Today's Date: 2020  Patient seen for 10 sessions         Feliz Rueda reports: Feeling better today.  Decreased pain level in cervical spine.     Objective   See Exercise, Manual, and Modality Logs for complete treatment.     Goals:   In three weeks, patient will report at least 25% reduction in pain level.  met  In three weeks, patient will demonstrate at least 25% improvement in cervical AROM.  Met     In six weeks, patient will report completing ADL's for 3 consecutive days without pain level over 1/10.   In six weeks, patient will demonstrate cervical rotation of at least 60 degrees bilaterally.   In six weeks, patient will demonstrate compliance with I HEP.   In six weeks, patient will demonstrate decreased perceived disability by decreasing score on NDI by at least 10%.        Assessment/Plan     Patient demonstrates meeting short term goals.  Progressing well towards AROM and pain goals.  Tolerates today's treatment well.     Progress per Plan of Care           Manual Therapy:   25   mins  81494;  Therapeutic Exercise:         mins  13917;     Neuromuscular Mackenzie:        mins  30264;    Therapeutic Activity:          mins  78871;     Gait Training:           mins  48069;     Ultrasound:          mins  28240;    Electrical Stimulation:   15     mins  12536 ( );  Dry Needling          mins self-pay    Timed Treatment:   25   mins   Total Treatment:     40   mins    Jose David Chapin PT  Physical Therapist                     [FreeTextEntry1] : \par Atrial fibrillation: Permanent - satisfactory rate control; tolerating anticoagulation. I recommend continuing metoprolol tartrate 50 mg b.i.d. and Eliquis 5 mg b.i.d.\par \par Hypertension: Blood pressure mildly elevated -- continue metoprolol; will reevaluate blood pressure at upcoming visits and titrate medications as needed.\par \par Preoperative cardiac examination:  I recommend an ischemia evaluation prior to abdominal surgery given the presence of hypertension, atrial fibrillation, abnormal ECG, and many years without medical attention; I ordered a pharmacologic nuclear stress test.

## 2021-06-22 NOTE — TELEPHONE ENCOUNTER
Caller: Mohit Feliz JOSE    Relationship: Self    Best call back number [phone]    Medication needed:   Requested Prescriptions     Pending Prescriptions Disp Refills   • simvastatin (ZOCOR) 40 MG tablet 90 tablet 4     Sig: Take 1 tablet by mouth Daily.       What additional details did the patient provide when requesting the medication:   1 WEEK    Does the patient have less than a 3 day supply:  [] Yes  [x] No    What is the patient's preferred pharmacy: [facility] - [address] [phone] [facility] [phone]

## 2021-06-24 RX ORDER — SIMVASTATIN 40 MG
40 TABLET ORAL DAILY
Qty: 90 TABLET | Refills: 4 | Status: SHIPPED | OUTPATIENT
Start: 2021-06-24 | End: 2022-09-19

## 2021-07-07 NOTE — TELEPHONE ENCOUNTER
Caller: Feliz Rueda    Relationship: Self    Best call back number:[phone]    Medication needed:   Requested Prescriptions     Pending Prescriptions Disp Refills   • Chlorcyclizine-Pseudoephed (Stahist AD) 25-60 MG tablet 42 tablet 2     Sig: One tablet bid       When do you need the refill by: 7/9/21    What additional details did the patient provide when requesting the medication: HAS FOUR PILLS LEFT    Does the patient have less than a 3 day supply:  [x] Yes  [] No    What is the patient's preferred pharmacy: [id number] - [place], [place] - [address] - [phone]  - [phone] FX

## 2021-07-08 RX ORDER — CHLORCYCLIZINE HYDROCHLORIDE AND PSEUDOEPHEDRINE HYDROCHLORIDE 25; 60 MG/1; MG/1
TABLET ORAL
Qty: 42 TABLET | Refills: 2 | OUTPATIENT
Start: 2021-07-08

## 2021-07-09 ENCOUNTER — TELEPHONE (OUTPATIENT)
Dept: FAMILY MEDICINE CLINIC | Facility: CLINIC | Age: 66
End: 2021-07-09

## 2021-07-09 RX ORDER — CHLORCYCLIZINE HYDROCHLORIDE AND PSEUDOEPHEDRINE HYDROCHLORIDE 25; 60 MG/1; MG/1
TABLET ORAL
Qty: 42 TABLET | Refills: 2 | Status: SHIPPED | OUTPATIENT
Start: 2021-07-09 | End: 2021-07-12

## 2021-07-09 RX ORDER — CHLORCYCLIZINE HYDROCHLORIDE AND PSEUDOEPHEDRINE HYDROCHLORIDE 25; 60 MG/1; MG/1
TABLET ORAL
Qty: 42 TABLET | Refills: 2 | Status: CANCELLED | OUTPATIENT
Start: 2021-07-09

## 2021-07-09 NOTE — TELEPHONE ENCOUNTER
Patient called again on this rx.  Said he is out and has been calling since Wednesday, which is ridiculous, he said.  Please send in today.

## 2021-07-12 RX ORDER — CHLORCYCLIZINE HYDROCHLORIDE AND PSEUDOEPHEDRINE HYDROCHLORIDE 25; 60 MG/1; MG/1
TABLET ORAL
Qty: 42 TABLET | Refills: 2 | Status: SHIPPED | OUTPATIENT
Start: 2021-07-12

## 2021-07-12 NOTE — TELEPHONE ENCOUNTER
Caller: Feliz Rueda    Relationship to patient: Self    Best call back number: [phone]    Patient is needing: PATIENT CALLED TO CHECK ON STATUS OF MEDICATION REFILL, WOULD LIKE TO PICK IT UP TODAY IF POSSIBLE AT THIS PHARMACY    PHARMACY: [id number] - [place], [place] - [address] - [phone]  - [phone]  [phone]

## 2021-07-16 RX ORDER — TAMSULOSIN HYDROCHLORIDE 0.4 MG/1
1 CAPSULE ORAL DAILY
Qty: 90 CAPSULE | Refills: 3 | Status: SHIPPED | OUTPATIENT
Start: 2021-07-16

## 2021-08-10 ENCOUNTER — LAB (OUTPATIENT)
Dept: FAMILY MEDICINE CLINIC | Facility: CLINIC | Age: 66
End: 2021-08-10

## 2021-08-10 ENCOUNTER — OFFICE VISIT (OUTPATIENT)
Dept: FAMILY MEDICINE CLINIC | Facility: CLINIC | Age: 66
End: 2021-08-10

## 2021-08-10 VITALS
RESPIRATION RATE: 16 BRPM | BODY MASS INDEX: 25.87 KG/M2 | WEIGHT: 191 LBS | HEIGHT: 72 IN | DIASTOLIC BLOOD PRESSURE: 82 MMHG | HEART RATE: 89 BPM | OXYGEN SATURATION: 95 % | SYSTOLIC BLOOD PRESSURE: 128 MMHG

## 2021-08-10 DIAGNOSIS — Z12.5 SCREENING PSA (PROSTATE SPECIFIC ANTIGEN): ICD-10-CM

## 2021-08-10 DIAGNOSIS — F41.9 ANXIETY: ICD-10-CM

## 2021-08-10 DIAGNOSIS — E78.2 MIXED HYPERLIPIDEMIA: ICD-10-CM

## 2021-08-10 DIAGNOSIS — R31.1 BENIGN ESSENTIAL MICROSCOPIC HEMATURIA: ICD-10-CM

## 2021-08-10 DIAGNOSIS — E78.2 MIXED HYPERLIPIDEMIA: Primary | ICD-10-CM

## 2021-08-10 DIAGNOSIS — M50.20 HERNIATED CERVICAL DISC: ICD-10-CM

## 2021-08-10 PROBLEM — M25.522 BILATERAL ELBOW JOINT PAIN: Status: RESOLVED | Noted: 2020-09-01 | Resolved: 2021-08-10

## 2021-08-10 PROBLEM — S50.02XA CONTUSION OF LEFT ELBOW: Status: RESOLVED | Noted: 2020-09-01 | Resolved: 2021-08-10

## 2021-08-10 PROBLEM — M50.30 DDD (DEGENERATIVE DISC DISEASE), CERVICAL: Status: ACTIVE | Noted: 2020-11-13

## 2021-08-10 PROBLEM — M25.521 BILATERAL ELBOW JOINT PAIN: Status: RESOLVED | Noted: 2020-09-01 | Resolved: 2021-08-10

## 2021-08-10 PROBLEM — S20.211D: Status: RESOLVED | Noted: 2020-09-01 | Resolved: 2021-08-10

## 2021-08-10 PROBLEM — M54.2 CERVICALGIA: Status: RESOLVED | Noted: 2020-10-12 | Resolved: 2021-08-10

## 2021-08-10 PROBLEM — S70.02XA CONTUSION OF LEFT HIP: Status: RESOLVED | Noted: 2020-09-01 | Resolved: 2021-08-10

## 2021-08-10 PROBLEM — S70.01XA CONTUSION OF RIGHT HIP: Status: RESOLVED | Noted: 2020-09-01 | Resolved: 2021-08-10

## 2021-08-10 PROBLEM — S16.1XXA ACUTE STRAIN OF NECK MUSCLE: Status: RESOLVED | Noted: 2020-10-12 | Resolved: 2021-08-10

## 2021-08-10 PROBLEM — M25.551 BILATERAL HIP PAIN: Status: RESOLVED | Noted: 2020-09-01 | Resolved: 2021-08-10

## 2021-08-10 PROBLEM — M54.2 NECK PAIN: Status: RESOLVED | Noted: 2020-12-12 | Resolved: 2021-08-10

## 2021-08-10 PROBLEM — S22.42XD: Status: RESOLVED | Noted: 2020-09-01 | Resolved: 2021-08-10

## 2021-08-10 PROBLEM — S50.01XA CONTUSION OF RIGHT ELBOW: Status: RESOLVED | Noted: 2020-09-01 | Resolved: 2021-08-10

## 2021-08-10 PROBLEM — M25.552 BILATERAL HIP PAIN: Status: RESOLVED | Noted: 2020-09-01 | Resolved: 2021-08-10

## 2021-08-10 PROBLEM — M70.62 TROCHANTERIC BURSITIS OF LEFT HIP: Status: RESOLVED | Noted: 2020-03-25 | Resolved: 2021-08-10

## 2021-08-10 PROBLEM — Z12.11 COLON CANCER SCREENING: Status: RESOLVED | Noted: 2021-02-12 | Resolved: 2021-08-10

## 2021-08-10 PROBLEM — R59.0 LYMPHADENOPATHY, CERVICAL: Status: RESOLVED | Noted: 2021-02-19 | Resolved: 2021-08-10

## 2021-08-10 PROCEDURE — 80053 COMPREHEN METABOLIC PANEL: CPT | Performed by: PHYSICIAN ASSISTANT

## 2021-08-10 PROCEDURE — 99214 OFFICE O/P EST MOD 30 MIN: CPT | Performed by: PHYSICIAN ASSISTANT

## 2021-08-10 PROCEDURE — 85025 COMPLETE CBC W/AUTO DIFF WBC: CPT | Performed by: PHYSICIAN ASSISTANT

## 2021-08-10 PROCEDURE — 36415 COLL VENOUS BLD VENIPUNCTURE: CPT

## 2021-08-10 PROCEDURE — 80061 LIPID PANEL: CPT | Performed by: PHYSICIAN ASSISTANT

## 2021-08-10 PROCEDURE — 87086 URINE CULTURE/COLONY COUNT: CPT

## 2021-08-10 PROCEDURE — G0103 PSA SCREENING: HCPCS | Performed by: PHYSICIAN ASSISTANT

## 2021-08-10 PROCEDURE — 81001 URINALYSIS AUTO W/SCOPE: CPT

## 2021-08-10 NOTE — PROGRESS NOTES
"Subjective   Feliz Rueda is a 65 y.o. male.     Chief Complaint   Patient presents with   • Anxiety     6 month f/u       /82 (BP Location: Right arm, Patient Position: Sitting, Cuff Size: Adult)   Pulse 89   Resp 16   Ht 182.9 cm (72\")   Wt 86.6 kg (191 lb)   SpO2 95%   BMI 25.90 kg/m²     BP Readings from Last 3 Encounters:   08/10/21 128/82   03/02/21 116/80   02/19/21 112/82       Wt Readings from Last 3 Encounters:   08/10/21 86.6 kg (191 lb)   03/02/21 81.6 kg (180 lb)   02/19/21 81.7 kg (180 lb 3.2 oz)       HPI presents to the clinic for 6-month follow-up for anxiety, cervical neck pain, hyperlipidemia, and PSA screening.  He is currently doing fine on his medications including Xanax as needed for anxiety.  He is doing good on Linzess for IBS constipation states that this medication has been very helpful for him.  He is having chronic neck pain that he feels like is worsening and would like to be referred back to physical therapy.  He has had physical therapy in the past and had significant relief of his neck pain with physical therapy.  He is due for his laboratory work today and of note he did have some blood in his urine on his last urinalysis done in the emergency department.  He does not have any gross hematuria.  No flank pain.  Does have a history of BPH.    The following portions of the patient's history were reviewed and updated as appropriate: allergies, current medications, past family history, past medical history, past social history, past surgical history and problem list.    Review of Systems    Objective   Physical Exam  Constitutional:       Appearance: He is well-developed.   HENT:      Head: Normocephalic and atraumatic.   Eyes:      Conjunctiva/sclera: Conjunctivae normal.      Pupils: Pupils are equal, round, and reactive to light.   Cardiovascular:      Rate and Rhythm: Normal rate and regular rhythm.      Heart sounds: No murmur heard.     Pulmonary:      Effort: " Pulmonary effort is normal.      Breath sounds: Normal breath sounds.   Abdominal:      General: Bowel sounds are normal.      Palpations: Abdomen is soft.      Tenderness: There is no abdominal tenderness.   Musculoskeletal:         General: Tenderness (cervical spine) present. No deformity. Normal range of motion.      Cervical back: Normal range of motion and neck supple.   Lymphadenopathy:      Cervical: No cervical adenopathy.   Skin:     General: Skin is warm and dry.      Capillary Refill: Capillary refill takes less than 2 seconds.      Findings: No rash.   Neurological:      Mental Status: He is alert and oriented to person, place, and time.      Cranial Nerves: No cranial nerve deficit.   Psychiatric:         Behavior: Behavior normal.         Thought Content: Thought content normal.         Judgment: Judgment normal.           Diagnoses and all orders for this visit:    1. Mixed hyperlipidemia (Primary)  -     Lipid panel; Future  -     Comprehensive metabolic panel; Future  -     CBC w AUTO Differential; Future    2. Benign essential microscopic hematuria  -     Urinalysis With Culture If Indicated -; Future    3. Herniated cervical disc  -     Ambulatory Referral to Physical Therapy Evaluate and treat    4. Screening PSA (prostate specific antigen)  -     PSA SCREENING; Future    5. Anxiety    Other orders  -     Cancel: Lipid panel; Future    Continue current medications and check urinalysis for benign essential hematuria.  He has had significant relief in the past with physical therapy for his chronic neck pain and so we will refer him back for physical therapy again.  He has follow-up in 6 months for a recheck on the above conditions.     No follow-ups on file.

## 2021-08-11 LAB
ALBUMIN SERPL-MCNC: 4.2 G/DL (ref 3.5–5.2)
ALBUMIN/GLOB SERPL: 1.8 G/DL
ALP SERPL-CCNC: 45 U/L (ref 39–117)
ALT SERPL W P-5'-P-CCNC: 24 U/L (ref 1–41)
ANION GAP SERPL CALCULATED.3IONS-SCNC: 11 MMOL/L (ref 5–15)
AST SERPL-CCNC: 19 U/L (ref 1–40)
BACTERIA UR QL AUTO: ABNORMAL /HPF
BASOPHILS # BLD AUTO: 0.03 10*3/MM3 (ref 0–0.2)
BASOPHILS NFR BLD AUTO: 0.5 % (ref 0–1.5)
BILIRUB SERPL-MCNC: 0.2 MG/DL (ref 0–1.2)
BILIRUB UR QL STRIP: NEGATIVE
BUN SERPL-MCNC: 14 MG/DL (ref 8–23)
BUN/CREAT SERPL: 14.9 (ref 7–25)
CALCIUM SPEC-SCNC: 9.5 MG/DL (ref 8.6–10.5)
CHLORIDE SERPL-SCNC: 103 MMOL/L (ref 98–107)
CHOLEST SERPL-MCNC: 175 MG/DL (ref 0–200)
CLARITY UR: CLEAR
CO2 SERPL-SCNC: 25 MMOL/L (ref 22–29)
COLOR UR: YELLOW
CREAT SERPL-MCNC: 0.94 MG/DL (ref 0.76–1.27)
DEPRECATED RDW RBC AUTO: 43.7 FL (ref 37–54)
EOSINOPHIL # BLD AUTO: 0.14 10*3/MM3 (ref 0–0.4)
EOSINOPHIL NFR BLD AUTO: 2.5 % (ref 0.3–6.2)
ERYTHROCYTE [DISTWIDTH] IN BLOOD BY AUTOMATED COUNT: 12.1 % (ref 12.3–15.4)
GFR SERPL CREATININE-BSD FRML MDRD: 81 ML/MIN/1.73
GLOBULIN UR ELPH-MCNC: 2.4 GM/DL
GLUCOSE SERPL-MCNC: 86 MG/DL (ref 65–99)
GLUCOSE UR STRIP-MCNC: NEGATIVE MG/DL
HCT VFR BLD AUTO: 44.8 % (ref 37.5–51)
HDLC SERPL-MCNC: 33 MG/DL (ref 40–60)
HGB BLD-MCNC: 14.3 G/DL (ref 13–17.7)
HGB UR QL STRIP.AUTO: NEGATIVE
HYALINE CASTS UR QL AUTO: ABNORMAL /LPF
IMM GRANULOCYTES # BLD AUTO: 0.05 10*3/MM3 (ref 0–0.05)
IMM GRANULOCYTES NFR BLD AUTO: 0.9 % (ref 0–0.5)
KETONES UR QL STRIP: NEGATIVE
LDLC SERPL CALC-MCNC: 112 MG/DL (ref 0–100)
LDLC/HDLC SERPL: 3.28 {RATIO}
LEUKOCYTE ESTERASE UR QL STRIP.AUTO: ABNORMAL
LYMPHOCYTES # BLD AUTO: 1.48 10*3/MM3 (ref 0.7–3.1)
LYMPHOCYTES NFR BLD AUTO: 26.6 % (ref 19.6–45.3)
MCH RBC QN AUTO: 31.2 PG (ref 26.6–33)
MCHC RBC AUTO-ENTMCNC: 31.9 G/DL (ref 31.5–35.7)
MCV RBC AUTO: 97.6 FL (ref 79–97)
MONOCYTES # BLD AUTO: 0.51 10*3/MM3 (ref 0.1–0.9)
MONOCYTES NFR BLD AUTO: 9.2 % (ref 5–12)
NEUTROPHILS NFR BLD AUTO: 3.36 10*3/MM3 (ref 1.7–7)
NEUTROPHILS NFR BLD AUTO: 60.3 % (ref 42.7–76)
NITRITE UR QL STRIP: NEGATIVE
NRBC BLD AUTO-RTO: 0 /100 WBC (ref 0–0.2)
PH UR STRIP.AUTO: 6 [PH] (ref 5–8)
PLATELET # BLD AUTO: 176 10*3/MM3 (ref 140–450)
PMV BLD AUTO: 10 FL (ref 6–12)
POTASSIUM SERPL-SCNC: 4.3 MMOL/L (ref 3.5–5.2)
PROT SERPL-MCNC: 6.6 G/DL (ref 6–8.5)
PROT UR QL STRIP: NEGATIVE
PSA SERPL-MCNC: 2.53 NG/ML (ref 0–4)
RBC # BLD AUTO: 4.59 10*6/MM3 (ref 4.14–5.8)
RBC # UR: ABNORMAL /HPF
REF LAB TEST METHOD: ABNORMAL
SODIUM SERPL-SCNC: 139 MMOL/L (ref 136–145)
SP GR UR STRIP: 1.01 (ref 1–1.03)
SQUAMOUS #/AREA URNS HPF: ABNORMAL /HPF
TRIGL SERPL-MCNC: 169 MG/DL (ref 0–150)
UROBILINOGEN UR QL STRIP: ABNORMAL
VLDLC SERPL-MCNC: 30 MG/DL (ref 5–40)
WBC # BLD AUTO: 5.57 10*3/MM3 (ref 3.4–10.8)
WBC UR QL AUTO: ABNORMAL /HPF

## 2021-08-12 LAB — BACTERIA SPEC AEROBE CULT: NO GROWTH

## 2021-08-17 DIAGNOSIS — F43.10 PTSD (POST-TRAUMATIC STRESS DISORDER): ICD-10-CM

## 2021-08-19 RX ORDER — ALPRAZOLAM 1 MG/1
TABLET ORAL
Qty: 270 TABLET | Refills: 1 | Status: SHIPPED | OUTPATIENT
Start: 2021-08-19 | End: 2022-02-17

## 2021-08-31 ENCOUNTER — TREATMENT (OUTPATIENT)
Dept: PHYSICAL THERAPY | Facility: CLINIC | Age: 66
End: 2021-08-31

## 2021-08-31 DIAGNOSIS — M50.20 HERNIATED CERVICAL DISC: Primary | ICD-10-CM

## 2021-08-31 PROCEDURE — 97110 THERAPEUTIC EXERCISES: CPT | Performed by: PHYSICAL THERAPIST

## 2021-08-31 PROCEDURE — 97162 PT EVAL MOD COMPLEX 30 MIN: CPT | Performed by: PHYSICAL THERAPIST

## 2021-08-31 NOTE — PROGRESS NOTES
Physical Therapy Initial Evaluation and Plan of Care    Patient: Feliz Rueda   : 1955  Diagnosis/ICD-10 Code:  Herniated cervical disc [M50.20]  Referring practitioner: Juan Bautista PA-C  Date of Initial Visit: 2021  Today's Date: 2021  Patient seen for 1 sessions           Subjective Questionnaire: NDI:18%      Subjective Evaluation    History of Present Illness  Mechanism of injury: Patient presents to physical therapy with cc of pain on left side of cervical spine.  Reports difficulty looking to the left due to pain.  Denies numbness or tingling in LUE.  Patient has had treatment for neck pain within the last year.  Reports that his symptoms have increased over the past 2-3 weeks.  Patient reports ADL's include yardwork.  Wishes to have less pain in cervical spine with activity.      Pain  Current pain ratin  At worst pain ratin  Quality: dull ache  Relieving factors: relaxation, rest and medications  Aggravating factors: movement, lifting and overhead activity  Progression: worsening    Treatments  Previous treatment: physical therapy  Patient Goals  Patient goals for therapy: decreased pain, increased motion, increased strength and return to sport/leisure activities             Objective          Active Range of Motion   Cervical/Thoracic Spine   Cervical    Flexion: 30 degrees   Extension: 38 degrees   Left lateral flexion: 25 degrees   Right lateral flexion: 35 degrees   Left rotation: 42 degrees   Right rotation: 51 degrees     Strength/Myotome Testing   Cervical Spine     Right   Normal strength    Left Shoulder     Planes of Motion   Flexion: 5   Abduction: 5   External rotation at 0°: 5   Internal rotation at 0°: 5     Left Elbow   Flexion: 5  Extension: 5    Left Wrist/Hand   Wrist extension: 4+  Wrist flexion: 5    Tests   Cervical     Left   Positive Spurling's sign.           Assessment & Plan     Assessment  Impairments: abnormal or restricted ROM, impaired physical  strength and pain with function  Assessment details: Patient presents to physical therapy with s/s congruent with those of cervical radiculopathy.  Noted limitations in AROM in cervical spine, weakness in LUE, and postural abnormalities.  Patient is appropriate for PT intervention in order to address these deficits so that he may complete household chore activities with less limitation.   Prognosis: good  Functional Limitations: uncomfortable because of pain  Goals  Plan Goals: In two weeks, patient will report at least 25% reduction in pain level.    In two weeks, patient will demonstrate at least 25% improvement in AROM in cervical spine.    In four weeks, patient will demonstrate at least 60 degrees of cervical rotation bilaterally.   In four weeks, patient will demonstrate BUE wfl without pain level over 2/10.  In four weeks, patient will demonstrate decreased perceived disability by decreasing score NDI by at least 12%.    Plan  Therapy options: will be seen for skilled physical therapy services  Planned modality interventions: TENS, thermotherapy (hydrocollator packs), traction, cryotherapy and dry needling  Planned therapy interventions: manual therapy, neuromuscular re-education, soft tissue mobilization, spinal/joint mobilization, strengthening, stretching, therapeutic activities, joint mobilization, home exercise program and functional ROM exercises  Duration in visits: 20  Plan details: 1-3 times per week        Manual Therapy:    8     mins  01831;  Therapeutic Exercise:    10     mins  35415;     Neuromuscular Mackenzie:        mins  84068;    Therapeutic Activity:          mins  82385;     Gait Training:           mins  68233;     Ultrasound:          mins  27878;    Electrical Stimulation:         mins  56311 ( );  Dry Needling          mins self-pay    Timed Treatment:   18   mins   Total Treatment:     38   mins    PT SIGNATURE: Jose David Chapin PT   DATE TREATMENT INITIATED: 8/31/2021    Initial  Certification  Certification Period: 11/29/2021  I certify that the therapy services are furnished while this patient is under my care.  The services outlined above are required by this patient, and will be reviewed every 90 days.     PHYSICIAN: Juan Bautista PA-C      DATE:     Please sign and return via fax to [phone].. Thank you, [facility].

## 2021-09-13 ENCOUNTER — TREATMENT (OUTPATIENT)
Dept: PHYSICAL THERAPY | Facility: CLINIC | Age: 66
End: 2021-09-13

## 2021-09-13 DIAGNOSIS — M50.20 HERNIATED CERVICAL DISC: Primary | ICD-10-CM

## 2021-09-13 PROCEDURE — 97140 MANUAL THERAPY 1/> REGIONS: CPT | Performed by: PHYSICAL THERAPIST

## 2021-09-13 PROCEDURE — 97110 THERAPEUTIC EXERCISES: CPT | Performed by: PHYSICAL THERAPIST

## 2021-09-13 NOTE — PROGRESS NOTES
Physical Therapy Daily Progress Note    Patient: Feliz Rueda   : 1955  Diagnosis/ICD-10 Code:  Herniated cervical disc [M50.20]  Referring practitioner: Juan Bautista PA-C  Date of Initial Visit: Type: THERAPY  Noted: 2021  Today's Date: 2021  Patient seen for 2 sessions         Feliz Rueda reports: Felt a little better after traction last visit.  Still having pain and difficulty with looking to the left.     Objective   See Exercise, Manual, and Modality Logs for complete treatment.       Assessment/Plan     Reports improved mobility after manual therapy.  Noted continued restriction in passive right sidebending and left rotation, addressed with MET.      Progress per Plan of Care           Manual Therapy:    25     mins  82930;  Therapeutic Exercise:    15     mins  58186;     Neuromuscular Mackenzie:        mins  63398;    Therapeutic Activity:          mins  78079;     Gait Training:           mins  88302;     Ultrasound:          mins  71594;    Electrical Stimulation:         mins  48232 (MC );  Dry Needling          mins self-pay    Timed Treatment:   40   mins   Total Treatment:     40   mins    Jose David Chapin PT  Physical Therapist

## 2021-09-17 ENCOUNTER — TELEPHONE (OUTPATIENT)
Dept: FAMILY MEDICINE CLINIC | Facility: CLINIC | Age: 66
End: 2021-09-17
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
## 2021-09-20 ENCOUNTER — TREATMENT (OUTPATIENT)
Dept: PHYSICAL THERAPY | Facility: CLINIC | Age: 66
End: 2021-09-20

## 2021-09-20 DIAGNOSIS — M50.20 HERNIATED CERVICAL DISC: Primary | ICD-10-CM

## 2021-09-20 PROCEDURE — 97140 MANUAL THERAPY 1/> REGIONS: CPT | Performed by: PHYSICAL THERAPIST

## 2021-09-20 PROCEDURE — 97110 THERAPEUTIC EXERCISES: CPT | Performed by: PHYSICAL THERAPIST

## 2021-09-20 NOTE — PROGRESS NOTES
Physical Therapy Daily Progress Note    Patient: Feliz Rueda   : 1955  Diagnosis/ICD-10 Code:  Herniated cervical disc [M50.20]  Referring practitioner: Juan Bautista PA-C  Date of Initial Visit: Type: THERAPY  Noted: 2021  Today's Date: 2021  Patient seen for 3 sessions         Feliz Rueda reports:  Had increase in neck pain over the weekend.  Reports that he did some weed eating and reports that this may have flared-up his symptoms.     Objective   See Exercise, Manual, and Modality Logs for complete treatment.       Assessment/Plan     Tolerates manual therapy well.  Patient requires minimal cueing for proper technique with exercise.  Progressing well.     Progress per Plan of Care           Manual Therapy:    30     mins  06146;  Therapeutic Exercise:    10     mins  70178;     Neuromuscular Mackenzie:        mins  86034;    Therapeutic Activity:          mins  23310;     Gait Training:           mins  60297;     Ultrasound:          mins  42573;    Electrical Stimulation:         mins  01150 ( );  Dry Needling          mins self-pay    Timed Treatment:   40   mins   Total Treatment:     40   mins    Jose David Chapin PT  Physical Therapist

## 2021-09-21 ENCOUNTER — TELEPHONE (OUTPATIENT)
Dept: FAMILY MEDICINE CLINIC | Facility: CLINIC | Age: 66
End: 2021-09-21

## 2021-09-21 DIAGNOSIS — R59.0 LYMPHADENOPATHY, CERVICAL: ICD-10-CM

## 2021-09-21 DIAGNOSIS — M50.20 HERNIATED CERVICAL DISC: Primary | ICD-10-CM

## 2021-09-24 ENCOUNTER — TREATMENT (OUTPATIENT)
Dept: PHYSICAL THERAPY | Facility: CLINIC | Age: 66
End: 2021-09-24

## 2021-09-24 DIAGNOSIS — M50.20 HERNIATED CERVICAL DISC: Primary | ICD-10-CM

## 2021-09-24 PROCEDURE — 97140 MANUAL THERAPY 1/> REGIONS: CPT | Performed by: PHYSICAL THERAPIST

## 2021-09-24 NOTE — PROGRESS NOTES
Physical Therapy Daily Progress Note    Patient: Feliz Rueda   : 1955  Diagnosis/ICD-10 Code:  Herniated cervical disc [M50.20]  Referring practitioner: Juan Bautista PA-C  Date of Initial Visit: Type: THERAPY  Noted: 2021  Today's Date: 2021  Patient seen for 4 sessions         Feliz Rueda reports:  Had to take pain medicine yesterday for lower back, reports decreased neck pain as a result.     Objective   See Exercise, Manual, and Modality Logs for complete treatment.       Assessment/Plan     Noted continued limitation in lower cervical spine with left rotation, addressed with MET.  Patient tolerates manual therapy well.  Compliant with HEP.     Progress per Plan of Care           Manual Therapy:    40     mins  59110;  Therapeutic Exercise:         mins  39028;     Neuromuscular Mackenzie:        mins  34629;    Therapeutic Activity:          mins  11788;     Gait Training:           mins  16060;     Ultrasound:          mins  11859;    Electrical Stimulation:         mins  39511 (MC );  Dry Needling          mins self-pay    Timed Treatment:   40   mins   Total Treatment:     40   mins    Jose David Chapin PT  Physical Therapist

## 2021-09-27 ENCOUNTER — TREATMENT (OUTPATIENT)
Dept: PHYSICAL THERAPY | Facility: CLINIC | Age: 66
End: 2021-09-27

## 2021-09-27 DIAGNOSIS — M50.20 HERNIATED CERVICAL DISC: Primary | ICD-10-CM

## 2021-09-27 PROCEDURE — 97140 MANUAL THERAPY 1/> REGIONS: CPT | Performed by: PHYSICAL THERAPIST

## 2021-09-27 NOTE — PROGRESS NOTES
Physical Therapy Daily Progress Note    Patient: Feliz Rueda   : 1955  Diagnosis/ICD-10 Code:  Herniated cervical disc [M50.20]  Referring practitioner: Juan Bautista PA-C  Date of Initial Visit: Type: THERAPY  Noted: 2021  Today's Date: 2021  Patient seen for 5 sessions         Feliz Rueda reports: Increased stiffness/pain near base of cervical spine over the weekend.  Reports pain on left side of cervical spine as well.     Objective   See Exercise, Manual, and Modality Logs for complete treatment.       Assessment/Plan     Tolerates manual therapy well.  Noted hypomobility throughout the lower cervical spine and upper thoracic spine.     Progress per Plan of Care           Manual Therapy:    38     mins  58600;  Therapeutic Exercise:    3     mins  70593;     Neuromuscular Mackenzie:        mins  70489;    Therapeutic Activity:          mins  58699;     Gait Training:           mins  50938;     Ultrasound:          mins  09784;    Electrical Stimulation:         mins  35425 (MC );  Dry Needling          mins self-pay    Timed Treatment:   41   mins   Total Treatment:     41   mins    Jose David Chapin PT  Physical Therapist

## 2021-10-01 ENCOUNTER — TREATMENT (OUTPATIENT)
Dept: PHYSICAL THERAPY | Facility: CLINIC | Age: 66
End: 2021-10-01

## 2021-10-01 DIAGNOSIS — M50.20 HERNIATED CERVICAL DISC: Primary | ICD-10-CM

## 2021-10-01 PROCEDURE — 97140 MANUAL THERAPY 1/> REGIONS: CPT | Performed by: PHYSICAL THERAPIST

## 2021-10-01 NOTE — PROGRESS NOTES
Physical Therapy Daily Progress Note    Patient: Feliz Rueda   : 1955  Diagnosis/ICD-10 Code:  Herniated cervical disc [M50.20]  Referring practitioner: Juan Bautista PA-C  Date of Initial Visit: Type: THERAPY  Noted: 2021  Today's Date: 10/1/2021  Patient seen for 6 sessions         Feliz Rueda reports:  Feeling better today.  Reports shooting basketball a little yesterday and not any more sore today.     Objective   See Exercise, Manual, and Modality Logs for complete treatment.       Assessment/Plan     Tolerates manual therapy well this visit.  Thoracic spine sore with PA glides, as well as hypomobile.      Progress per Plan of Care           Manual Therapy:    38     mins  43084;  Therapeutic Exercise:    2     mins  93839;     Neuromuscular Mackenzie:        mins  81781;    Therapeutic Activity:          mins  05400;     Gait Training:           mins  85409;     Ultrasound:          mins  88675;    Electrical Stimulation:         mins  00956 ( );  Dry Needling          mins self-pay    Timed Treatment:   40   mins   Total Treatment:     40   mins    Jose David Chapin PT  Physical Therapist

## 2021-10-05 ENCOUNTER — TREATMENT (OUTPATIENT)
Dept: PHYSICAL THERAPY | Facility: CLINIC | Age: 66
End: 2021-10-05

## 2021-10-05 DIAGNOSIS — M50.20 HERNIATED CERVICAL DISC: Primary | ICD-10-CM

## 2021-10-05 PROCEDURE — 97140 MANUAL THERAPY 1/> REGIONS: CPT | Performed by: PHYSICAL THERAPIST

## 2021-10-05 NOTE — PROGRESS NOTES
"   Physical Therapy Daily Progress Note    Patient: Feliz Rueda   : 1955  Diagnosis/ICD-10 Code:  Herniated cervical disc [M50.20]  Referring practitioner: Juan Bautista PA-C  Date of Initial Visit: Type: THERAPY  Noted: 2021  Today's Date: 10/5/2021  Patient seen for 7 sessions         Feliz Rueda reports:  Cervical AROM feels better, however feel \"knot\" near right shoulder blade that was preventing him from falling asleep last night due to pain.     Objective   See Exercise, Manual, and Modality Logs for complete treatment.       Assessment/Plan     Tolerates manual therapy well.  Modified technique with standing flys, as well as adjusted resistance for flys and rows.  Patient demonstrates proper technique with these modifications.  Feeling better after manual therapy.     Progress per Plan of Care           Manual Therapy:    40     mins  58489;  Therapeutic Exercise:    5     mins  04879;     Neuromuscular Mackenzie:        mins  75493;    Therapeutic Activity:          mins  90156;     Gait Training:           mins  60415;     Ultrasound:          mins  59148;    Electrical Stimulation:         mins  73011 ( );  Dry Needling          mins self-pay    Timed Treatment:   45   mins   Total Treatment:     45   mins    Jose David Chapin PT  Physical Therapist                      "

## 2021-10-08 ENCOUNTER — TREATMENT (OUTPATIENT)
Dept: PHYSICAL THERAPY | Facility: CLINIC | Age: 66
End: 2021-10-08

## 2021-10-08 DIAGNOSIS — M50.20 HERNIATED CERVICAL DISC: Primary | ICD-10-CM

## 2021-10-08 PROCEDURE — 97140 MANUAL THERAPY 1/> REGIONS: CPT | Performed by: PHYSICAL THERAPIST

## 2021-10-08 NOTE — PROGRESS NOTES
Physical Therapy Daily Progress Note    Patient: Feliz Rueda   : 1955  Diagnosis/ICD-10 Code:  Herniated cervical disc [M50.20]  Referring practitioner: Juan Bautista PA-C  Date of Initial Visit: Type: THERAPY  Noted: 2021  Today's Date: 10/8/2021  Patient seen for 8 sessions         Feliz Rueda reports:  Pain near right scapula has been present consistently over the past week.      Objective   See Exercise, Manual, and Modality Logs for complete treatment.       Assessment/Plan     Tolerates manual therapy well.  Reports decreased pain level after trigger point release.  AROM improved at after manual therapy.     Progress per Plan of Care           Manual Therapy:    34     mins  53765;  Therapeutic Exercise:         mins  32037;     Neuromuscular Mackenzie:        mins  49027;    Therapeutic Activity:          mins  37656;     Gait Training:           mins  72072;     Ultrasound:          mins  58247;    Electrical Stimulation:         mins  61593 ( );  Dry Needling          mins self-pay    Timed Treatment:   34   mins   Total Treatment:     34   mins    Jose David Chapin PT  Physical Therapist

## 2021-10-11 ENCOUNTER — TREATMENT (OUTPATIENT)
Dept: PHYSICAL THERAPY | Facility: CLINIC | Age: 66
End: 2021-10-11

## 2021-10-11 DIAGNOSIS — M50.20 HERNIATED CERVICAL DISC: Primary | ICD-10-CM

## 2021-10-11 PROCEDURE — 97140 MANUAL THERAPY 1/> REGIONS: CPT | Performed by: PHYSICAL THERAPIST

## 2021-10-11 NOTE — PROGRESS NOTES
Physical Therapy Daily Progress Note    Patient: Feliz Rueda   : 1955  Diagnosis/ICD-10 Code:  Herniated cervical disc [M50.20]  Referring practitioner: Juan Bautista PA-C  Date of Initial Visit: Type: THERAPY  Noted: 2021  Today's Date: 10/11/2021  Patient seen for 9 sessions         Feliz Rueda reports:  Did some yard work over the weekend.  Reports neck felt okay during activity, but sore today.      Objective   See Exercise, Manual, and Modality Logs for complete treatment.       Assessment/Plan     Tolerates manual therapy well.  Noted trigger point near medial border of right scapula, which responds well to trigger point release.  Noted hypomobility in lower cervical spine with right SB and left rotation.  Feeling better post-tx.     Progress per Plan of Care           Manual Therapy:    40     mins  65828;  Therapeutic Exercise:         mins  52177;     Neuromuscular Mackenzie:        mins  48645;    Therapeutic Activity:          mins  89964;     Gait Training:           mins  89761;     Ultrasound:          mins  11719;    Electrical Stimulation:         mins  74028 ( );  Dry Needling          mins self-pay    Timed Treatment:   40   mins   Total Treatment:     40   mins    Jose David Chapin PT  Physical Therapist

## 2021-10-20 RX ORDER — CYCLOBENZAPRINE HCL 10 MG
TABLET ORAL
Qty: 90 TABLET | Refills: 1 | Status: SHIPPED | OUTPATIENT
Start: 2021-10-20 | End: 2021-10-25 | Stop reason: SDUPTHER

## 2021-10-20 RX ORDER — CYCLOBENZAPRINE HCL 10 MG
10 TABLET ORAL 3 TIMES DAILY PRN
Qty: 90 TABLET | Refills: 1 | Status: CANCELLED | OUTPATIENT
Start: 2021-10-20

## 2021-10-20 NOTE — TELEPHONE ENCOUNTER
Caller: Feliz Rueda    Relationship: Self          Requested Prescriptions:   Requested Prescriptions     Pending Prescriptions Disp Refills   • cyclobenzaprine (FLEXERIL) 10 MG tablet 90 tablet 1     Sig: Take 1 tablet by mouth 3 (Three) Times a Day As Needed for Muscle Spasms.        Pharmacy where request should be sent: [facility] [place]    Additional details provided by patient: WILL NEED THIS CALLED IN     Best call back number:[phone]    Does the patient have less than a 3 day supply:  [x] Yes  [] No    Fei Zeng   10/20/21 14:05 EDT

## 2021-10-25 ENCOUNTER — TELEPHONE (OUTPATIENT)
Dept: FAMILY MEDICINE CLINIC | Facility: CLINIC | Age: 66
End: 2021-10-25

## 2021-10-25 ENCOUNTER — TREATMENT (OUTPATIENT)
Dept: PHYSICAL THERAPY | Facility: CLINIC | Age: 66
End: 2021-10-25

## 2021-10-25 DIAGNOSIS — M50.20 HERNIATED CERVICAL DISC: Primary | ICD-10-CM

## 2021-10-25 PROCEDURE — 97140 MANUAL THERAPY 1/> REGIONS: CPT | Performed by: PHYSICAL THERAPIST

## 2021-10-25 RX ORDER — CYCLOBENZAPRINE HCL 10 MG
10 TABLET ORAL 3 TIMES DAILY PRN
Qty: 90 TABLET | Refills: 1 | Status: SHIPPED | OUTPATIENT
Start: 2021-10-25 | End: 2022-08-22

## 2021-10-25 NOTE — TELEPHONE ENCOUNTER
Incoming Refill Request      Medication requested (name and dose):    cyclobenzaprine (FLEXERIL) 10 MG tablet   1 ordered         Pharmacy where request should be sent: [facility] - [address] [phone] [facility] [phone] [phone]    Additional details provided by patient: PATIENT HAS THIS SENT TO ANOTHER PHARMACY, BUT CAN GET IT AT A CHEAPER COST THROUGH [facility]     Best call back number: [phone]    Does the patient have less than a 3 day supply:  [x] Yes  [] No    Rah Da Silva Rep  10/25/21, 11:57 EDT

## 2021-10-25 NOTE — PROGRESS NOTES
Physical Therapy Daily Progress Note    Patient: Feliz Rueda   : 1955  Diagnosis/ICD-10 Code:  Herniated cervical disc [M50.20]  Referring practitioner: Juan Bautista PA-C  Date of Initial Visit: Type: THERAPY  Noted: 2021  Today's Date: 10/25/2021  Patient seen for 10 sessions         Feliz Rueda reports: ROM feels better, however still having pain in right scapula.  Does reports some stiffness on left side of cervical spine with left sidebending and right rotation of cervical spine.     Objective   See Exercise, Manual, and Modality Logs for complete treatment.      Goals:   In two weeks, patient will report at least 25% reduction in pain level.  met  In two weeks, patient will demonstrate at least 25% improvement in AROM in cervical spine. met    In four weeks, patient will demonstrate at least 60 degrees of cervical rotation bilaterally.  NM  In four weeks, patient will demonstrate BUE wfl without pain level over 2/10. met  In four weeks, patient will demonstrate decreased perceived disability by decreasing score NDI by at least 12%.  NM      Assessment/Plan     Tolerates manual therapy well this visit.  Noted trigger point still present near medial border of right scapula.  Patient feeling slightly better after manual therapy this visit.     Progress per Plan of Care           Manual Therapy:    40     mins  68342;  Therapeutic Exercise:         mins  23627;     Neuromuscular Mackenzie:        mins  43620;    Therapeutic Activity:          mins  02498;     Gait Training:           mins  34581;     Ultrasound:          mins  25154;    Electrical Stimulation:         mins  34680 ( );  Dry Needling          mins self-pay    Timed Treatment:   40   mins   Total Treatment:     40   mins    Jose David Chapin PT  Physical Therapist

## 2021-10-29 ENCOUNTER — TREATMENT (OUTPATIENT)
Dept: PHYSICAL THERAPY | Facility: CLINIC | Age: 66
End: 2021-10-29

## 2021-10-29 DIAGNOSIS — M50.20 HERNIATED CERVICAL DISC: Primary | ICD-10-CM

## 2021-10-29 PROCEDURE — 97140 MANUAL THERAPY 1/> REGIONS: CPT | Performed by: PHYSICAL THERAPIST

## 2021-10-29 NOTE — PROGRESS NOTES
Physical Therapy Daily Progress Note    Patient: Feliz Rueda   : 1955  Diagnosis/ICD-10 Code:  Herniated cervical disc [M50.20]  Referring practitioner: Juan Bautista PA-C  Date of Initial Visit: Type: THERAPY  Noted: 2021  Today's Date: 10/29/2021  Patient seen for 11 sessions         Feliz Rueda reports: Has been taking it easy this week since flare-up last week.  Feeling better today.     Objective   See Exercise, Manual, and Modality Logs for complete treatment.       Assessment/Plan     Noted PROM limitation still present with right SB and left rotation.  Patient feeling well after manual therapy this visit.     Progress per Plan of Care           Manual Therapy:    40     mins  85926;  Therapeutic Exercise:         mins  36618;     Neuromuscular Mackenzie:        mins  62347;    Therapeutic Activity:          mins  62479;     Gait Training:           mins  54736;     Ultrasound:          mins  03392;    Electrical Stimulation:         mins  55697 ( );  Dry Needling          mins self-pay    Timed Treatment:   40   mins   Total Treatment:     40   mins    Jose David Chapin PT  Physical Therapist

## 2021-11-01 ENCOUNTER — TREATMENT (OUTPATIENT)
Dept: PHYSICAL THERAPY | Facility: CLINIC | Age: 66
End: 2021-11-01

## 2021-11-01 DIAGNOSIS — M50.20 HERNIATED CERVICAL DISC: Primary | ICD-10-CM

## 2021-11-01 PROCEDURE — 97140 MANUAL THERAPY 1/> REGIONS: CPT | Performed by: PHYSICAL THERAPIST

## 2021-11-01 NOTE — PROGRESS NOTES
Physical Therapy Daily Progress Note    Patient: Feliz Rueda   : 1955  Diagnosis/ICD-10 Code:  Herniated cervical disc [M50.20]  Referring practitioner: Juan Bautista PA-C  Date of Initial Visit: Type: THERAPY  Noted: 2021  Today's Date: 2021  Patient seen for 12 sessions         Feliz Rueda reports:  Cervical spine has been feeling better with increased activity.  Still stiff with left rotation.       Objective   See Exercise, Manual, and Modality Logs for complete treatment.       Assessment/Plan     Tolerates manual therapy well today.  Patient compliant with HEP.    Progress per Plan of Care          Manual Therapy:    30     mins  72796;  Therapeutic Exercise:    5     mins  04763;     Neuromuscular Mackenzie:        mins  43710;    Therapeutic Activity:          mins  28593;     Gait Training:           mins  81306;     Ultrasound:          mins  47539;    Electrical Stimulation:        mins  80751 ( );  Dry Needling          mins self-pay    Timed Treatment:   35   mins   Total Treatment:     35   mins    Jose David Chapin PT  Physical Therapist

## 2021-11-03 RX ORDER — ESCITALOPRAM OXALATE 10 MG/1
10 TABLET ORAL DAILY
Qty: 90 TABLET | Refills: 3 | Status: SHIPPED | OUTPATIENT
Start: 2021-11-03 | End: 2022-10-17

## 2021-11-04 ENCOUNTER — TREATMENT (OUTPATIENT)
Dept: PHYSICAL THERAPY | Facility: CLINIC | Age: 66
End: 2021-11-04

## 2021-11-04 DIAGNOSIS — M50.20 HERNIATED CERVICAL DISC: Primary | ICD-10-CM

## 2021-11-04 PROCEDURE — 97140 MANUAL THERAPY 1/> REGIONS: CPT | Performed by: PHYSICAL THERAPIST

## 2021-11-04 NOTE — PROGRESS NOTES
Physical Therapy Daily Progress Note    Patient: Feliz Rueda   : 1955  Diagnosis/ICD-10 Code:  Herniated cervical disc [M50.20]  Referring practitioner: Juan Bautista PA-C  Date of Initial Visit: Type: THERAPY  Noted: 2021  Today's Date: 2021  Patient seen for 13 sessions         Feliz Rueda reports: Had to work on his truck a few days ago and reports no aggravation of symptoms.  Feeling well today.     Objective   See Exercise, Manual, and Modality Logs for complete treatment.     Equal bilateral cervical rotation noted this visit    Assessment/Plan     Tolerates manual therapy well today.  Patient to continue with I HEP, will return to tx if needed.     Progress per Plan of Care           Manual Therapy:    38     mins  54473;  Therapeutic Exercise:    5     mins  17706;     Neuromuscular Mackenzie:        mins  30480;    Therapeutic Activity:          mins  58558;     Gait Training:           mins  71877;     Ultrasound:          mins  21930;    Electrical Stimulation:         mins  71471 ( );  Dry Needling          mins self-pay    Timed Treatment:   43   mins   Total Treatment:     43   mins    Jose David Chapin PT  Physical Therapist

## 2022-02-10 ENCOUNTER — OFFICE VISIT (OUTPATIENT)
Dept: FAMILY MEDICINE CLINIC | Facility: CLINIC | Age: 67
End: 2022-02-10

## 2022-02-10 VITALS
DIASTOLIC BLOOD PRESSURE: 75 MMHG | HEART RATE: 81 BPM | HEIGHT: 72 IN | BODY MASS INDEX: 26.28 KG/M2 | WEIGHT: 194 LBS | OXYGEN SATURATION: 96 % | RESPIRATION RATE: 12 BRPM | SYSTOLIC BLOOD PRESSURE: 117 MMHG

## 2022-02-10 DIAGNOSIS — M50.20 HERNIATED CERVICAL DISC: Primary | ICD-10-CM

## 2022-02-10 DIAGNOSIS — F41.9 ANXIETY: ICD-10-CM

## 2022-02-10 DIAGNOSIS — E78.2 MIXED HYPERLIPIDEMIA: ICD-10-CM

## 2022-02-10 PROCEDURE — 99214 OFFICE O/P EST MOD 30 MIN: CPT | Performed by: PHYSICIAN ASSISTANT

## 2022-02-10 NOTE — PROGRESS NOTES
"Subjective   Feliz Rueda is a 66 y.o. male.     Chief Complaint   Patient presents with   • Anxiety   • Hyperlipidemia       /75   Pulse 81   Resp 12   Ht 182.9 cm (72\")   Wt 88 kg (194 lb)   SpO2 96%   BMI 26.31 kg/m²     BP Readings from Last 3 Encounters:   02/10/22 117/75   08/10/21 128/82   03/02/21 116/80       Wt Readings from Last 3 Encounters:   02/10/22 88 kg (194 lb)   08/10/21 86.6 kg (191 lb)   03/02/21 81.6 kg (180 lb)       HPI Presents to the clinic for management of anxiety, hld, and cervical neck pain. The neck pain is ok but he still has restricted rom of the neck when he turns to the left. He does not have any radiculopathy. Other than the neck pain he is feeling good at this time. No chest pain, soa, headaches.     The following portions of the patient's history were reviewed and updated as appropriate: allergies, current medications, past family history, past medical history, past social history, past surgical history and problem list.    Review of Systems    Objective   Physical Exam  Constitutional:       Appearance: He is well-developed.   HENT:      Head: Normocephalic and atraumatic.   Eyes:      Conjunctiva/sclera: Conjunctivae normal.      Pupils: Pupils are equal, round, and reactive to light.   Cardiovascular:      Rate and Rhythm: Normal rate and regular rhythm.      Heart sounds: No murmur heard.      Pulmonary:      Effort: Pulmonary effort is normal.      Breath sounds: Normal breath sounds.   Abdominal:      General: Bowel sounds are normal.      Palpations: Abdomen is soft.      Tenderness: There is no abdominal tenderness.   Musculoskeletal:         General: No deformity. Normal range of motion.      Cervical back: Normal range of motion and neck supple.   Lymphadenopathy:      Cervical: No cervical adenopathy.   Skin:     General: Skin is warm and dry.      Capillary Refill: Capillary refill takes less than 2 seconds.      Findings: No rash.   Neurological:      " Mental Status: He is alert and oriented to person, place, and time.      Cranial Nerves: No cranial nerve deficit.   Psychiatric:         Behavior: Behavior normal.         Thought Content: Thought content normal.         Judgment: Judgment normal.           Diagnoses and all orders for this visit:    1. Herniated cervical disc (Primary)  -     Ambulatory Referral to Physical Therapy    2. Mixed hyperlipidemia  Comments:  stable recheck in 6 months.     3. Anxiety  Comments:  continue current medications. takes as prescribed.         Return in about 6 months (around 8/10/2022), or if symptoms worsen or fail to improve.

## 2022-02-15 DIAGNOSIS — F43.10 PTSD (POST-TRAUMATIC STRESS DISORDER): ICD-10-CM

## 2022-02-17 RX ORDER — ALPRAZOLAM 1 MG/1
TABLET ORAL
Qty: 270 TABLET | Refills: 1 | Status: SHIPPED | OUTPATIENT
Start: 2022-02-17 | End: 2022-08-04

## 2022-02-23 RX ORDER — TRAZODONE HYDROCHLORIDE 150 MG/1
TABLET ORAL
Qty: 90 TABLET | Refills: 3 | Status: SHIPPED | OUTPATIENT
Start: 2022-02-23 | End: 2022-03-01 | Stop reason: SDUPTHER

## 2022-02-23 RX ORDER — DICLOFENAC SODIUM 75 MG/1
75 TABLET, DELAYED RELEASE ORAL 2 TIMES DAILY WITH MEALS
Qty: 180 TABLET | Refills: 4 | Status: SHIPPED | OUTPATIENT
Start: 2022-02-23 | End: 2023-03-08

## 2022-02-23 NOTE — TELEPHONE ENCOUNTER
Caller: Feliz Rueda    Relationship: Self    Best call back number: [phone]    Requested Prescriptions:   Requested Prescriptions     Pending Prescriptions Disp Refills   • traZODone (DESYREL) 150 MG tablet 90 tablet 3   • diclofenac (VOLTAREN) 75 MG EC tablet 180 tablet 4     Sig: Take 1 tablet by mouth 2 (Two) Times a Day With Meals.        Pharmacy where request should be sent: [facility] [address] [phone] [facility] [phone]      Additional details provided by patient: WILL NEED THESE SENT AS SOON AS POSSIBLE       Does the patient have less than a 3 day supply:  [] Yes  [x] No    Fei Mendoza   02/23/22 09:10 EST

## 2022-02-28 ENCOUNTER — TELEPHONE (OUTPATIENT)
Dept: FAMILY MEDICINE CLINIC | Facility: CLINIC | Age: 67
End: 2022-02-28

## 2022-03-01 RX ORDER — TRAZODONE HYDROCHLORIDE 150 MG/1
TABLET ORAL
Qty: 90 TABLET | Refills: 3 | Status: SHIPPED | OUTPATIENT
Start: 2022-03-01 | End: 2022-07-19 | Stop reason: SDUPTHER

## 2022-03-16 NOTE — PROGRESS NOTES
Physical Therapy Daily Progress Note    Patient: Feliz Rueda   : 1955  Diagnosis/ICD-10 Code:  Acute strain of neck muscle, initial encounter [S16.1XXA]  Referring practitioner: Self Referring  Date of Initial Visit: Type: THERAPY  Noted: 10/21/2020  Today's Date: 3/31/2021  Patient seen for 29 sessions         Feliz Rueda reports:   Cervical spine feeling better.  Reports doing some yard work over the past few days.  States that he avoids straining himself too much.  Still some resistance with cervical rotation.    Objective   See Exercise, Manual, and Modality Logs for complete treatment.       Assessment/Plan     Patient tolerates today's treatment well.  Noted hypomobility in thoracic spine continues.  AROM/PROM in cervical spine remains improved.     Progress per Plan of Care           Manual Therapy:    25     mins  62567;  Therapeutic Exercise:    5     mins  34890;     Neuromuscular Mackenzie:        mins  51569;    Therapeutic Activity:          mins  62683;     Gait Training:           mins  01298;     Ultrasound:          mins  05702;    Electrical Stimulation:    15     mins  24094 ( );  Dry Needling          mins self-pay    Timed Treatment:   30   mins   Total Treatment:     45   mins    Jose David Chapin PT  Physical Therapist                       Application Tool (Optional): Liquid Nitrogen Sprayer Render Note In Bullet Format When Appropriate: No Number Of Freeze-Thaw Cycles: 3 freeze-thaw cycles Render Post-Care Instructions In Note?: yes Detail Level: Detailed Aperture Size (Optional): D Duration Of Freeze Thaw-Cycle (Seconds): 5 Post-Care Instructions: I reviewed with the patient in detail post-care instructions. Patient should also wear sun protection, and avoid picking at any of the treated lesions. Consent: Verbal consent was obtained including but not limited to risks of crusting, scabbing, blistering, scarring, darker or lighter pigmentary change, recurrence, incomplete removal and infection.

## 2022-03-17 RX ORDER — FENOFIBRATE 160 MG/1
TABLET ORAL
Qty: 90 TABLET | Refills: 3 | Status: SHIPPED | OUTPATIENT
Start: 2022-03-17

## 2022-05-19 ENCOUNTER — TELEPHONE (OUTPATIENT)
Dept: FAMILY MEDICINE CLINIC | Facility: CLINIC | Age: 67
End: 2022-05-19

## 2022-05-19 RX ORDER — LINACLOTIDE 145 UG/1
CAPSULE, GELATIN COATED ORAL
Qty: 90 CAPSULE | Refills: 3 | Status: SHIPPED | OUTPATIENT
Start: 2022-05-19 | End: 2023-03-20

## 2022-05-19 NOTE — TELEPHONE ENCOUNTER
Caller: Feliz Rueda    Relationship to patient: Self    Best call back number: [phone]    Patient is needing: PATIENT SAID HE RECEIVED A TEXT MESSAGE SAYING [facility] MAY NOT BE READY TO SHIP    IT WAS APPROVED FOR REFILL TODAY BY [facility], HUB ADVISED PATIENT TO CONTACT THE [facility] REGARDING THE TEXT MESSAGE

## 2022-07-19 ENCOUNTER — LAB (OUTPATIENT)
Dept: FAMILY MEDICINE CLINIC | Facility: CLINIC | Age: 67
End: 2022-07-19

## 2022-07-19 ENCOUNTER — OFFICE VISIT (OUTPATIENT)
Dept: FAMILY MEDICINE CLINIC | Facility: CLINIC | Age: 67
End: 2022-07-19

## 2022-07-19 VITALS
RESPIRATION RATE: 16 BRPM | HEART RATE: 102 BPM | TEMPERATURE: 97.6 F | OXYGEN SATURATION: 96 % | HEIGHT: 72 IN | WEIGHT: 190 LBS | DIASTOLIC BLOOD PRESSURE: 80 MMHG | BODY MASS INDEX: 25.73 KG/M2 | SYSTOLIC BLOOD PRESSURE: 116 MMHG

## 2022-07-19 DIAGNOSIS — Z23 NEED FOR VACCINATION: ICD-10-CM

## 2022-07-19 DIAGNOSIS — E78.2 MIXED HYPERLIPIDEMIA: ICD-10-CM

## 2022-07-19 DIAGNOSIS — Z00.00 MEDICARE ANNUAL WELLNESS VISIT, INITIAL: Primary | ICD-10-CM

## 2022-07-19 DIAGNOSIS — E55.9 VITAMIN D DEFICIENCY: ICD-10-CM

## 2022-07-19 PROCEDURE — 1160F RVW MEDS BY RX/DR IN RCRD: CPT | Performed by: PHYSICIAN ASSISTANT

## 2022-07-19 PROCEDURE — G0009 ADMIN PNEUMOCOCCAL VACCINE: HCPCS | Performed by: PHYSICIAN ASSISTANT

## 2022-07-19 PROCEDURE — 80061 LIPID PANEL: CPT | Performed by: PHYSICIAN ASSISTANT

## 2022-07-19 PROCEDURE — 99213 OFFICE O/P EST LOW 20 MIN: CPT | Performed by: PHYSICIAN ASSISTANT

## 2022-07-19 PROCEDURE — 90677 PCV20 VACCINE IM: CPT | Performed by: PHYSICIAN ASSISTANT

## 2022-07-19 PROCEDURE — 96160 PT-FOCUSED HLTH RISK ASSMT: CPT | Performed by: PHYSICIAN ASSISTANT

## 2022-07-19 PROCEDURE — G0438 PPPS, INITIAL VISIT: HCPCS | Performed by: PHYSICIAN ASSISTANT

## 2022-07-19 PROCEDURE — 80053 COMPREHEN METABOLIC PANEL: CPT | Performed by: PHYSICIAN ASSISTANT

## 2022-07-19 PROCEDURE — 82306 VITAMIN D 25 HYDROXY: CPT | Performed by: PHYSICIAN ASSISTANT

## 2022-07-19 PROCEDURE — 1170F FXNL STATUS ASSESSED: CPT | Performed by: PHYSICIAN ASSISTANT

## 2022-07-19 PROCEDURE — 36415 COLL VENOUS BLD VENIPUNCTURE: CPT

## 2022-07-19 PROCEDURE — 85025 COMPLETE CBC W/AUTO DIFF WBC: CPT | Performed by: PHYSICIAN ASSISTANT

## 2022-07-19 RX ORDER — TRAZODONE HYDROCHLORIDE 150 MG/1
TABLET ORAL
Qty: 90 TABLET | Refills: 3 | Status: SHIPPED | OUTPATIENT
Start: 2022-07-19 | End: 2022-07-21 | Stop reason: SDUPTHER

## 2022-07-19 NOTE — PROGRESS NOTES
The ABCs of the Annual Wellness Visit  Initial Medicare Wellness Visit    Chief Complaint   Patient presents with   • Medicare Wellness-Initial Visit     Subjective   History of Present Illness:  Feliz Rueda is a 66 y.o. male who presents for an Initial Medicare Wellness Visit. Following with urology tomorrow. Colonoscopy was done about a year ago and has a 10 year follow up. He has not had a shingles vaccine and pneumonia vaccine.     The following portions of the patient's history were reviewed and   updated as appropriate: allergies, current medications, past family history, past medical history, past social history, past surgical history and problem list.     Compared to one year ago, the patient feels his physical   health is the same.    Compared to one year ago, the patient feels his mental   health is better.    Recent Hospitalizations:  He was not admitted to the hospital during the last year.       Current Medical Providers:  Patient Care Team:  Juan Bautista PA-C as PCP - General (Physician Assistant)    Outpatient Medications Prior to Visit   Medication Sig Dispense Refill   • ALPRAZolam (XANAX) 1 MG tablet TAKE 1 TABLET THREE TIMES A DAY AS NEEDED FOR ANXIETY 270 tablet 1   • aspirin (aspirin) 81 MG EC tablet ADULT ASPIRIN EC LOW STRENGTH 81 MG ORAL TABLET DELAYED RELEASE     • Chlorcyclizine-Pseudoephed (Stahist AD) 25-60 MG tablet TAKE 1 TABLET BY MOUTH TWICE DAILY 42 tablet 2   • cyclobenzaprine (FLEXERIL) 10 MG tablet Take 1 tablet by mouth 3 (Three) Times a Day As Needed for Muscle Spasms. for muscle spams 90 tablet 1   • diclofenac (VOLTAREN) 75 MG EC tablet Take 1 tablet by mouth 2 (Two) Times a Day With Meals. 180 tablet 4   • escitalopram (Lexapro) 10 MG tablet Take 1 tablet by mouth Daily. 90 tablet 3   • fenofibrate 160 MG tablet TAKE 1 TABLET DAILY 90 tablet 3   • FIBER PO FIBER TABS     • lansoprazole (PREVACID) 15 MG capsule Take 15 mg by mouth Daily.     • Linzess 145 MCG capsule  capsule TAKE 1 CAPSULE EVERY MORNING BEFORE BREAKFAST 90 capsule 3   • multivitamin with minerals tablet tablet Take 1 tablet by mouth Daily.     • simvastatin (ZOCOR) 40 MG tablet Take 1 tablet by mouth Daily. 90 tablet 4   • tamsulosin (FLOMAX) 0.4 MG capsule 24 hr capsule Take 1 capsule by mouth Daily. 90 capsule 3   • traZODone (DESYREL) 150 MG tablet 1/2 tablet at bedtime 90 tablet 3   • docusate sodium (COLACE) 100 MG capsule 1 capsule Daily.     • lidocaine (LIDODERM) 5 % Place 2 patches on the skin as directed by provider Daily. Remove & Discard patch within 12 hours or as directed by MD 10 patch 0   • triamcinolone (KENALOG) 0.1 % cream TRIAMCINOLONE ACETONIDE 0.1 % CREA       No facility-administered medications prior to visit.       No opioid medication identified on active medication list. I have reviewed chart for other potential  high risk medication/s and harmful drug interactions in the elderly.          Aspirin is on active medication list. Aspirin use is indicated based on review of current medical condition/s. Pros and cons of this therapy have been discussed today. Benefits of this medication outweigh potential harm.  Patient has been encouraged to continue taking this medication.  .      Patient Active Problem List   Diagnosis   • Anemia   • Anxiety   • Elevated PSA   • Erectile dysfunction   • Gastroesophageal reflux disease   • Hyperlipidemia   • Insomnia   • Left ventricular hypertrophy   • Mitral valve insufficiency   • Osteoarthritis   • Asymptomatic varicose veins of right lower extremity   • Hyperglycemia   • Acute posttraumatic stress syndrome   • Dyspnea on exertion   • Screening PSA (prostate specific antigen)   • Irritable bowel syndrome with constipation   • DDD (degenerative disc disease), cervical   • Benign essential microscopic hematuria   • Herniated cervical disc     Advance Care Planning  Advance Directive is not on file.  ACP discussion was held with the patient during this  "visit. Patient has an advance directive (not in EMR), copy requested.          Objective       Vitals:    07/19/22 1327   BP: 116/80   BP Location: Right arm   Pulse: 102   Resp: 16   Temp: 97.6 °F (36.4 °C)   TempSrc: Infrared   SpO2: 96%   Weight: 86.2 kg (190 lb)   Height: 182.9 cm (72\")     Estimated body mass index is 25.77 kg/m² as calculated from the following:    Height as of this encounter: 182.9 cm (72\").    Weight as of this encounter: 86.2 kg (190 lb).    BMI is >= 25 and <30. (Overweight) The following options were offered after discussion;: exercise counseling/recommendations and nutrition counseling/recommendations      Does the patient have evidence of cognitive impairment? No    Physical Exam  Constitutional:       Appearance: He is well-developed.   HENT:      Head: Normocephalic and atraumatic.      Right Ear: Tympanic membrane normal.      Left Ear: Tympanic membrane normal.      Nose: No congestion.      Mouth/Throat:      Pharynx: No oropharyngeal exudate or posterior oropharyngeal erythema.   Eyes:      Conjunctiva/sclera: Conjunctivae normal.      Pupils: Pupils are equal, round, and reactive to light.   Cardiovascular:      Rate and Rhythm: Normal rate and regular rhythm.      Heart sounds: No murmur heard.  Pulmonary:      Effort: Pulmonary effort is normal.      Breath sounds: Normal breath sounds.   Abdominal:      General: Bowel sounds are normal.      Palpations: Abdomen is soft.      Tenderness: There is no abdominal tenderness.   Musculoskeletal:         General: No deformity. Normal range of motion.      Cervical back: Normal range of motion and neck supple.   Lymphadenopathy:      Cervical: No cervical adenopathy.   Skin:     General: Skin is warm and dry.      Findings: No rash.   Neurological:      Mental Status: He is alert and oriented to person, place, and time.      Cranial Nerves: No cranial nerve deficit.   Psychiatric:         Behavior: Behavior normal.         Thought " Content: Thought content normal.         Judgment: Judgment normal.               HEALTH RISK ASSESSMENT    Smoking Status:  Social History     Tobacco Use   Smoking Status Never Smoker   Smokeless Tobacco Never Used     Alcohol Consumption:  Social History     Substance and Sexual Activity   Alcohol Use Yes     Fall Risk Screen:    EMRE Fall Risk Assessment was completed, and patient is at LOW risk for falls.Assessment completed on:7/19/2022    Depression Screen:   PHQ-2/PHQ-9 Depression Screening 7/19/2022   Retired PHQ-9 Total Score -   Retired Total Score -   Little Interest or Pleasure in Doing Things 0-->not at all   Feeling Down, Depressed or Hopeless 0-->not at all   PHQ-9: Brief Depression Severity Measure Score 0       Health Habits and Functional and Cognitive Screening:  Functional & Cognitive Status 7/19/2022   Do you have difficulty preparing food and eating? No   Do you have difficulty bathing yourself, getting dressed or grooming yourself? No   Do you have difficulty using the toilet? No   Do you have difficulty moving around from place to place? No   Do you have trouble with steps or getting out of a bed or a chair? No   Current Diet Well Balanced Diet   Dental Exam Up to date   Eye Exam Up to date   Exercise (times per week) 3 times per week   Current Exercises Include Yard Work;Light Weights   Do you need help using the phone?  No   Are you deaf or do you have serious difficulty hearing?  No   Do you need help with transportation? No   Do you need help shopping? No   Do you need help preparing meals?  No   Do you need help with housework?  No   Do you need help with laundry? No   Do you need help taking your medications? No   Do you need help managing money? No   Do you ever drive or ride in a car without wearing a seat belt? No   Have you felt unusual stress, anger or loneliness in the last month? No   Who do you live with? Alone   If you need help, do you have trouble finding someone available  to you? No   Do you have difficulty concentrating, remembering or making decisions? No       Age-appropriate Screening Schedule:  Refer to the list below for future screening recommendations based on patient's age, sex and/or medical conditions. Orders for these recommended tests are listed in the plan section. The patient has been provided with a written plan.    Health Maintenance   Topic Date Due   • ZOSTER VACCINE (1 of 2) 08/19/2022 (Originally 9/16/2006)   • LIPID PANEL  08/10/2022   • INFLUENZA VACCINE  10/01/2022   • TDAP/TD VACCINES (2 - Td or Tdap) 04/04/2024            Assessment & Plan   CMS Preventative Services Quick Reference  Risk Factors Identified During Encounter  Cardiovascular Disease  Chronic Pain   Immunizations Discussed/Encouraged (specific Immunizations; Prevnar 20 (Pneumococcal 20-valent conjugate)Shingrix.   The above risks/problems have been discussed with the patient.  Follow up actions/plans if indicated are seen below in the Assessment/Plan Section.  Pertinent information has been shared with the patient in the After Visit Summary.    Diagnoses and all orders for this visit:    1. Medicare annual wellness visit, initial (Primary)    2. Vitamin D deficiency  -     Vitamin D 25 hydroxy; Future    3. Mixed hyperlipidemia  -     CBC w AUTO Differential; Future  -     Comprehensive metabolic panel; Future  -     Lipid panel; Future    4. Need for vaccination  -     Pneumococcal Conjugate Vaccine 20-Valent All    Other orders  -     traZODone (DESYREL) 150 MG tablet; 1/2 tablet at bedtime  Dispense: 90 tablet; Refill: 3        Follow Up:  Return in about 6 months (around 1/19/2023), or if symptoms worsen or fail to improve.     An After Visit Summary and PPPS were made available to the patient.          I spent  minutes caring for Feliz on this date of service. This time includes time spent by me in the following activities:preparing for the visit, reviewing tests, obtaining and/or reviewing  a separately obtained history, performing a medically appropriate examination and/or evaluation , counseling and educating the patient/family/caregiver, ordering medications, tests, or procedures and documenting information in the medical record

## 2022-07-20 ENCOUNTER — TELEPHONE (OUTPATIENT)
Dept: FAMILY MEDICINE CLINIC | Facility: CLINIC | Age: 67
End: 2022-07-20

## 2022-07-20 LAB
25(OH)D3 SERPL-MCNC: 47.9 NG/ML (ref 30–100)
ALBUMIN SERPL-MCNC: 4.6 G/DL (ref 3.5–5.2)
ALBUMIN/GLOB SERPL: 2.4 G/DL
ALP SERPL-CCNC: 47 U/L (ref 39–117)
ALT SERPL W P-5'-P-CCNC: 28 U/L (ref 1–41)
ANION GAP SERPL CALCULATED.3IONS-SCNC: 11 MMOL/L (ref 5–15)
AST SERPL-CCNC: 23 U/L (ref 1–40)
BASOPHILS # BLD AUTO: 0.03 10*3/MM3 (ref 0–0.2)
BASOPHILS NFR BLD AUTO: 0.6 % (ref 0–1.5)
BILIRUB SERPL-MCNC: 0.2 MG/DL (ref 0–1.2)
BUN SERPL-MCNC: 14 MG/DL (ref 8–23)
BUN/CREAT SERPL: 13.6 (ref 7–25)
CALCIUM SPEC-SCNC: 9.3 MG/DL (ref 8.6–10.5)
CHLORIDE SERPL-SCNC: 107 MMOL/L (ref 98–107)
CHOLEST SERPL-MCNC: 157 MG/DL (ref 0–200)
CO2 SERPL-SCNC: 22 MMOL/L (ref 22–29)
CREAT SERPL-MCNC: 1.03 MG/DL (ref 0.76–1.27)
DEPRECATED RDW RBC AUTO: 43.4 FL (ref 37–54)
EGFRCR SERPLBLD CKD-EPI 2021: 80.6 ML/MIN/1.73
EOSINOPHIL # BLD AUTO: 0.2 10*3/MM3 (ref 0–0.4)
EOSINOPHIL NFR BLD AUTO: 3.7 % (ref 0.3–6.2)
ERYTHROCYTE [DISTWIDTH] IN BLOOD BY AUTOMATED COUNT: 12.4 % (ref 12.3–15.4)
GLOBULIN UR ELPH-MCNC: 1.9 GM/DL
GLUCOSE SERPL-MCNC: 96 MG/DL (ref 65–99)
HCT VFR BLD AUTO: 43.1 % (ref 37.5–51)
HDLC SERPL-MCNC: 25 MG/DL (ref 40–60)
HGB BLD-MCNC: 14.1 G/DL (ref 13–17.7)
IMM GRANULOCYTES # BLD AUTO: 0.05 10*3/MM3 (ref 0–0.05)
IMM GRANULOCYTES NFR BLD AUTO: 0.9 % (ref 0–0.5)
LDLC SERPL CALC-MCNC: 99 MG/DL (ref 0–100)
LDLC/HDLC SERPL: 3.78 {RATIO}
LYMPHOCYTES # BLD AUTO: 1.55 10*3/MM3 (ref 0.7–3.1)
LYMPHOCYTES NFR BLD AUTO: 28.5 % (ref 19.6–45.3)
MCH RBC QN AUTO: 31.3 PG (ref 26.6–33)
MCHC RBC AUTO-ENTMCNC: 32.7 G/DL (ref 31.5–35.7)
MCV RBC AUTO: 95.8 FL (ref 79–97)
MONOCYTES # BLD AUTO: 0.46 10*3/MM3 (ref 0.1–0.9)
MONOCYTES NFR BLD AUTO: 8.5 % (ref 5–12)
NEUTROPHILS NFR BLD AUTO: 3.14 10*3/MM3 (ref 1.7–7)
NEUTROPHILS NFR BLD AUTO: 57.8 % (ref 42.7–76)
NRBC BLD AUTO-RTO: 0 /100 WBC (ref 0–0.2)
PLATELET # BLD AUTO: 181 10*3/MM3 (ref 140–450)
PMV BLD AUTO: 9.9 FL (ref 6–12)
POTASSIUM SERPL-SCNC: 4.7 MMOL/L (ref 3.5–5.2)
PROT SERPL-MCNC: 6.5 G/DL (ref 6–8.5)
RBC # BLD AUTO: 4.5 10*6/MM3 (ref 4.14–5.8)
SODIUM SERPL-SCNC: 140 MMOL/L (ref 136–145)
TRIGL SERPL-MCNC: 188 MG/DL (ref 0–150)
VLDLC SERPL-MCNC: 33 MG/DL (ref 5–40)
WBC NRBC COR # BLD: 5.43 10*3/MM3 (ref 3.4–10.8)

## 2022-07-20 RX ORDER — TRAZODONE HYDROCHLORIDE 150 MG/1
TABLET ORAL
Qty: 90 TABLET | Refills: 3 | Status: CANCELLED | OUTPATIENT
Start: 2022-07-20

## 2022-07-20 NOTE — TELEPHONE ENCOUNTER
Caller: Feliz Rueda    Relationship: Self    Best call back number: [phone]    Requested Prescriptions:   Requested Prescriptions     Pending Prescriptions Disp Refills   • traZODone (DESYREL) 150 MG tablet 90 tablet 3     Si/2 tablet at bedtime        Pharmacy where request should be sent: [facility] - [address] [phone] [facility] [phone]      Additional details provided by patient: KEDAR CYR , AT THE APPOINTMENT 22. INCREASED THE DOSAGE TO 1 TABLET DAILY. WILL NEED THIS CALLED IN WITH THE DOSAGE CHANGE.     Does the patient have less than a 3 day supply:  [x] Yes  [] No    Fei Mendoza   22 12:05 EDT

## 2022-07-21 RX ORDER — TRAZODONE HYDROCHLORIDE 150 MG/1
150 TABLET ORAL NIGHTLY
Qty: 90 TABLET | Refills: 3 | Status: SHIPPED | OUTPATIENT
Start: 2022-07-21

## 2022-07-25 ENCOUNTER — TELEPHONE (OUTPATIENT)
Dept: FAMILY MEDICINE CLINIC | Facility: CLINIC | Age: 67
End: 2022-07-25

## 2022-07-29 ENCOUNTER — TELEPHONE (OUTPATIENT)
Dept: FAMILY MEDICINE CLINIC | Facility: CLINIC | Age: 67
End: 2022-07-29

## 2022-07-29 NOTE — TELEPHONE ENCOUNTER
Caller: Feliz Rueda    Relationship: Self    Best call back number: [phone]    What is the medical concern/diagnosis: NECK PAIN FROM HERNIATED DISK    What specialty or service is being requested: PHYSICAL THERAPY    What is the office location: [facility]

## 2022-08-01 DIAGNOSIS — M50.30 DDD (DEGENERATIVE DISC DISEASE), CERVICAL: Primary | ICD-10-CM

## 2022-08-03 DIAGNOSIS — F43.10 PTSD (POST-TRAUMATIC STRESS DISORDER): ICD-10-CM

## 2022-08-04 RX ORDER — ALPRAZOLAM 1 MG/1
TABLET ORAL
Qty: 270 TABLET | Refills: 1 | Status: SHIPPED | OUTPATIENT
Start: 2022-08-04 | End: 2023-01-30 | Stop reason: SDUPTHER

## 2022-08-11 ENCOUNTER — TELEPHONE (OUTPATIENT)
Dept: FAMILY MEDICINE CLINIC | Facility: CLINIC | Age: 67
End: 2022-08-11

## 2022-08-11 DIAGNOSIS — M50.90 CERVICAL DISC DISORDER: Primary | ICD-10-CM
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
## 2022-08-15 ENCOUNTER — TELEPHONE (OUTPATIENT)
Dept: FAMILY MEDICINE CLINIC | Facility: CLINIC | Age: 67
End: 2022-08-15
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
## 2022-08-18 ENCOUNTER — OFFICE VISIT (OUTPATIENT)
Dept: FAMILY MEDICINE CLINIC | Facility: CLINIC | Age: 67
End: 2022-08-18

## 2022-08-18 VITALS
OXYGEN SATURATION: 99 % | DIASTOLIC BLOOD PRESSURE: 84 MMHG | SYSTOLIC BLOOD PRESSURE: 130 MMHG | HEIGHT: 72 IN | WEIGHT: 183 LBS | RESPIRATION RATE: 16 BRPM | HEART RATE: 88 BPM | BODY MASS INDEX: 24.79 KG/M2

## 2022-08-18 DIAGNOSIS — R07.81 RIB PAIN ON RIGHT SIDE: Primary | ICD-10-CM

## 2022-08-18 DIAGNOSIS — R07.89 COSTOCHONDRAL PAIN: ICD-10-CM

## 2022-08-18 PROCEDURE — 99213 OFFICE O/P EST LOW 20 MIN: CPT | Performed by: PHYSICIAN ASSISTANT
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
## 2022-08-22 RX ORDER — CYCLOBENZAPRINE HCL 10 MG
TABLET ORAL
Qty: 90 TABLET | Refills: 11 | Status: SHIPPED | OUTPATIENT
Start: 2022-08-22 | End: 2022-11-15 | Stop reason: SDUPTHER

## 2022-08-26 ENCOUNTER — TREATMENT (OUTPATIENT)
Dept: PHYSICAL THERAPY | Facility: CLINIC | Age: 67
End: 2022-08-26

## 2022-08-26 DIAGNOSIS — M50.30 DDD (DEGENERATIVE DISC DISEASE), CERVICAL: Primary | ICD-10-CM

## 2022-08-26 PROCEDURE — 97162 PT EVAL MOD COMPLEX 30 MIN: CPT | Performed by: PHYSICAL THERAPIST

## 2022-08-26 PROCEDURE — 97140 MANUAL THERAPY 1/> REGIONS: CPT | Performed by: PHYSICAL THERAPIST

## 2022-08-26 NOTE — PROGRESS NOTES
Physical Therapy Initial Evaluation and Plan of Care    Patient: Feliz Rueda   : 1955  Diagnosis/ICD-10 Code:  DDD (degenerative disc disease), cervical [M50.30]  Referring practitioner: Juan Bautista PA-C  Date of Initial Visit: 2022  Today's Date: 2022  Patient seen for 1 sessions           Subjective Questionnaire: NDI: 28%      Subjective Evaluation    History of Present Illness  Mechanism of injury: Patient presents to physical therapy with cc of neck pain and pain in right arm (upper), as well as tingling in right arm (lower).  Patient has had chronic neck pain for many years.  Patient had PT intervention last year for same condition.  Patient reports history of arthritis in cervical spine.  Reports that when he sits up straight and pulls his head back (chin tuck) he gets pain in right shoulder.  Patient states that after activity he gets stiffness in cervical spine.  Wishes to complete ADL's and yard work with less pain/limitation.     Pain  Current pain ratin  Quality: sharp  Relieving factors: medications  Aggravating factors: standing, movement and lifting  Progression: worsening    Treatments  Previous treatment: physical therapy  Patient Goals  Patient goals for therapy: decreased pain, increased motion and return to sport/leisure activities             Objective          Static Posture     Head  Forward.    Shoulders  Rounded.    Palpation     Right Tenderness of the levator scapulae and upper trapezius.     Active Range of Motion   Cervical/Thoracic Spine   Cervical    Flexion: 20 degrees   Extension: 30 degrees   Left lateral flexion: 5 degrees   Right lateral flexion: 20 degrees   Left rotation: 15 degrees   Right rotation: 45 degrees     Strength/Myotome Testing   Cervical Spine     Left   Normal strength    Right   Normal strength          Assessment & Plan     Assessment  Impairments: abnormal or restricted ROM, lacks appropriate home exercise program and pain with  function  Functional Limitations: uncomfortable because of pain  Assessment details: Patient presents to physical therapy with s/s congruent with MD diagnosis and DDD in cervical spine.  Noted limitations in AROM in cervical spine, as well as muscle guarding and pain with palpation of cervical musculature.  Patient is appropriate for PT intervention in order to decrease pain level and improve AROM in cervical spine so that he may complete ADL's and yard work with less pain/limitation.  Prognosis: good    Goals  Plan Goals: In two weeks, patient will report at least 25% reduction in pain level.    In two weeks, patient will demonstrate at least 25% improvement in AROM in cervical spine.    In four weeks, patient will demonstrate at least 60 degrees of cervical rotation bilaterally.   In four weeks, patient will demonstrate cervical AROM WFL without pain level over 2/10.  In four weeks, patient will demonstrate decreased perceived disability by decreasing score on NDI by at least 12%.    Plan  Therapy options: will be seen for skilled therapy services  Planned modality interventions: cryotherapy, dry needling, thermotherapy (hydrocollator packs) and traction  Planned therapy interventions: manual therapy, neuromuscular re-education, postural training, soft tissue mobilization, spinal/joint mobilization, strengthening, stretching, therapeutic activities, joint mobilization, home exercise program and functional ROM exercises  Duration in visits: 20  Plan details: 1-2 times per week        Manual Therapy:    10     mins  73015;  Therapeutic Exercise:    5     mins  07380;     Neuromuscular Mackenzie:        mins  97034;    Therapeutic Activity:          mins  13268;     Gait Training:           mins  73203;     Ultrasound:          mins  14386;    Electrical Stimulation:         mins  77003 ( );  Dry Needling          mins self-pay    Timed Treatment:   15   mins   Total Treatment:     45   mins    PT SIGNATURE: Jose David  Dari, PT   DATE TREATMENT INITIATED: 8/26/2022    Initial Certification  Certification Period: 11/24/2022  I certify that the therapy services are furnished while this patient is under my care.  The services outlined above are required by this patient, and will be reviewed every 90 days.     PHYSICIAN: Juan Bautista PA-C      DATE:     Please sign and return via fax to [phone].. Thank you, [facility].

## 2022-08-31 ENCOUNTER — HOSPITAL ENCOUNTER (OUTPATIENT)
Dept: CT IMAGING | Facility: HOSPITAL | Age: 67
Discharge: HOME | End: 2022-08-31
Admitting: PHYSICIAN ASSISTANT

## 2022-08-31 DIAGNOSIS — R07.81 RIB PAIN ON RIGHT SIDE: ICD-10-CM

## 2022-08-31 DIAGNOSIS — R07.89 COSTOCHONDRAL PAIN: ICD-10-CM

## 2022-08-31 PROCEDURE — 71250 CT THORAX DX C-: CPT

## 2022-09-07 ENCOUNTER — TREATMENT (OUTPATIENT)
Dept: PHYSICAL THERAPY | Facility: CLINIC | Age: 67
End: 2022-09-07

## 2022-09-07 DIAGNOSIS — M50.30 DDD (DEGENERATIVE DISC DISEASE), CERVICAL: Primary | ICD-10-CM

## 2022-09-07 PROCEDURE — 97110 THERAPEUTIC EXERCISES: CPT | Performed by: PHYSICAL THERAPIST

## 2022-09-07 PROCEDURE — 97140 MANUAL THERAPY 1/> REGIONS: CPT | Performed by: PHYSICAL THERAPIST

## 2022-09-09 NOTE — PROGRESS NOTES
Physical Therapy Daily Progress Note    Patient: Feliz Rueda   : 1955  Diagnosis/ICD-10 Code:  DDD (degenerative disc disease), cervical [M50.30]  Referring practitioner: Juan Bautista PA-C  Date of Initial Visit: Type: THERAPY  Noted: 2022  Today's Date: 2022  Patient seen for 2 sessions         Feliz Rueda reports: Felt well for a few days, however sore today due to doing some yard work.  Right side is more sore than the left.     Objective   See Exercise, Manual, and Modality Logs for complete treatment.       Assessment/Plan     Tolerates manual therapy well this visit.  Noted hypomobility in thoracic spine, as well as with right sidebending and left rotation.     Progress per Plan of Care           Manual Therapy:    30     mins  23598;  Therapeutic Exercise:    10     mins  16297;     Neuromuscular Mackenzie:        mins  51118;    Therapeutic Activity:          mins  97411;     Gait Training:           mins  74934;     Ultrasound:          mins  74116;    Electrical Stimulation:         mins  89558 ( );  Dry Needling          mins self-pay    Timed Treatment:   40   mins   Total Treatment:     40   mins    Jose David Chapin PT  Physical Therapist

## 2022-09-16 ENCOUNTER — TREATMENT (OUTPATIENT)
Dept: PHYSICAL THERAPY | Facility: CLINIC | Age: 67
End: 2022-09-16

## 2022-09-16 DIAGNOSIS — M50.30 DDD (DEGENERATIVE DISC DISEASE), CERVICAL: Primary | ICD-10-CM

## 2022-09-16 DIAGNOSIS — M50.20 HERNIATED CERVICAL DISC: ICD-10-CM

## 2022-09-16 PROCEDURE — 97110 THERAPEUTIC EXERCISES: CPT | Performed by: PHYSICAL THERAPIST

## 2022-09-16 PROCEDURE — 97140 MANUAL THERAPY 1/> REGIONS: CPT | Performed by: PHYSICAL THERAPIST

## 2022-09-16 NOTE — PROGRESS NOTES
Physical Therapy Daily Progress Note    Patient: Feliz Rueda   : 1955  Diagnosis/ICD-10 Code:  DDD (degenerative disc disease), cervical [M50.30]  Referring practitioner: Juan Bautista PA-C  Date of Initial Visit: Type: THERAPY  Noted: 2022  Today's Date: 2022  Patient seen for 3 sessions         Feliz Rueda reports:  Range of motion feels better periodically.  However, after increased activity reports stiffness in neck returns.  Feels right cervical rotation is better, however still limited to the left.       Objective   See Exercise, Manual, and Modality Logs for complete treatment.       Assessment/Plan     Tolerates manual therapy well this visit.   Reports occasional tingling in RUE with manual distraction, however with increase cervical flexion symptoms resolve.  Improved joint mobility in upper thoracic spine noted this visit.     Progress per Plan of Care           Manual Therapy:    30     mins  32553;  Therapeutic Exercise:    8     mins  15460;     Neuromuscular Mackenzie:        mins  90431;    Therapeutic Activity:          mins  53970;     Gait Training:           mins  28942;     Ultrasound:          mins  15779;    Electrical Stimulation:         mins  85749 ( );  Dry Needling          mins self-pay    Timed Treatment:   38   mins   Total Treatment:     38   mins    Jose David Chapin PT  Physical Therapist

## 2022-09-19 RX ORDER — SIMVASTATIN 40 MG
TABLET ORAL
Qty: 90 TABLET | Refills: 3 | Status: SHIPPED | OUTPATIENT
Start: 2022-09-19

## 2022-09-20 ENCOUNTER — TREATMENT (OUTPATIENT)
Dept: PHYSICAL THERAPY | Facility: CLINIC | Age: 67
End: 2022-09-20

## 2022-09-20 DIAGNOSIS — M50.30 DDD (DEGENERATIVE DISC DISEASE), CERVICAL: Primary | ICD-10-CM

## 2022-09-20 PROCEDURE — 97140 MANUAL THERAPY 1/> REGIONS: CPT | Performed by: PHYSICAL THERAPIST

## 2022-09-20 PROCEDURE — 97110 THERAPEUTIC EXERCISES: CPT | Performed by: PHYSICAL THERAPIST

## 2022-09-20 NOTE — PROGRESS NOTES
Physical Therapy Daily Progress Note    Patient: Feliz Rueda   : 1955  Diagnosis/ICD-10 Code:  DDD (degenerative disc disease), cervical [M50.30]  Referring practitioner: Juan Bautista PA-C  Date of Initial Visit: Type: THERAPY  Noted: 2022  Today's Date: 2022  Patient seen for 4 sessions         Feliz Rueda reports: Did some yard work on Saturday and was still sore the next day, however not as bad.  Feels ROM is improved.     Objective   See Exercise, Manual, and Modality Logs for complete treatment.       Assessment/Plan     Tolerates manual therapy well.  Hypomobility in thoracic spine still present, noted during PA glides.  Feeling well at end of visit.    Progress per Plan of Care           Manual Therapy:    30     mins  67746;  Therapeutic Exercise:    8     mins  33757;     Neuromuscular Mackenzie:        mins  45217;    Therapeutic Activity:          mins  46057;     Gait Training:           mins  97222;     Ultrasound:          mins  94493;    Electrical Stimulation:         mins  54415 ( );  Dry Needling          mins self-pay    Timed Treatment:   38   mins   Total Treatment:     38   mins    Jose David Chapin PT  Physical Therapist

## 2022-09-27 ENCOUNTER — TREATMENT (OUTPATIENT)
Dept: PHYSICAL THERAPY | Facility: CLINIC | Age: 67
End: 2022-09-27

## 2022-09-27 DIAGNOSIS — M50.30 DDD (DEGENERATIVE DISC DISEASE), CERVICAL: Primary | ICD-10-CM

## 2022-09-27 PROCEDURE — 97140 MANUAL THERAPY 1/> REGIONS: CPT | Performed by: PHYSICAL THERAPIST

## 2022-09-27 NOTE — PROGRESS NOTES
Physical Therapy Daily Progress Note    Patient: Feliz Rueda   : 1955  Diagnosis/ICD-10 Code:  DDD (degenerative disc disease), cervical [M50.30]  Referring practitioner: Juan Bautista PA-C  Date of Initial Visit: Type: THERAPY  Noted: 2022  Today's Date: 2022  Patient seen for 5 sessions         Feliz Rueda reports:  Had neck soreness and stiffness after carrying 2 gallon water buckets this weekend.  Feels better today.      Objective   See Exercise, Manual, and Modality Logs for complete treatment.       Assessment/Plan     Tolerates manual therapy well.  Reports less pain and improved mobility post-tx.  Patient demonstrates proper technique with newly added home exercise.     Progress per Plan of Care           Manual Therapy:    38     mins  09918;  Therapeutic Exercise:    5     mins  89059;     Neuromuscular Mackenzie:        mins  64066;    Therapeutic Activity:          mins  88853;     Gait Training:           mins  32549;     Ultrasound:          mins  56989;    Electrical Stimulation:         mins  01712 ( );  Dry Needling          mins self-pay    Timed Treatment:   43   mins   Total Treatment:     43   mins    Jose David Chapin PT  Physical Therapist

## 2022-10-04 ENCOUNTER — TREATMENT (OUTPATIENT)
Dept: PHYSICAL THERAPY | Facility: CLINIC | Age: 67
End: 2022-10-04

## 2022-10-04 DIAGNOSIS — M50.30 DDD (DEGENERATIVE DISC DISEASE), CERVICAL: Primary | ICD-10-CM

## 2022-10-04 PROCEDURE — 97140 MANUAL THERAPY 1/> REGIONS: CPT | Performed by: PHYSICAL THERAPIST

## 2022-10-04 NOTE — PROGRESS NOTES
Physical Therapy Daily Progress Note    Patient: Feliz Rueda   : 1955  Diagnosis/ICD-10 Code:  DDD (degenerative disc disease), cervical [M50.30]  Referring practitioner: Juan Bautista PA-C  Date of Initial Visit: Type: THERAPY  Noted: 2022  Today's Date: 10/4/2022  Patient seen for 6 sessions         Feliz Rueda reports:  Has been doing yard work without neck pain over the past week.  Reports improved mobility in neck with ADL's.     Objective   See Exercise, Manual, and Modality Logs for complete treatment.       Assessment/Plan     Tolerates manual therapy well this visit.  Noted less restriction with left sidebending and right rotation.  Progressing well.     Progress per Plan of Care           Manual Therapy:    38     mins  63695;  Therapeutic Exercise:    3     mins  02548;     Neuromuscular Mackenzie:        mins  16384;    Therapeutic Activity:          mins  26711;     Gait Training:           mins  15548;     Ultrasound:          mins  59215;    Electrical Stimulation:         mins  27903 ( );  Dry Needling          mins self-pay    Timed Treatment:   41   mins   Total Treatment:     41   mins    Jose David Chapin PT  Physical Therapist

## 2022-10-11 ENCOUNTER — TREATMENT (OUTPATIENT)
Dept: PHYSICAL THERAPY | Facility: CLINIC | Age: 67
End: 2022-10-11

## 2022-10-11 DIAGNOSIS — M50.30 DDD (DEGENERATIVE DISC DISEASE), CERVICAL: Primary | ICD-10-CM

## 2022-10-11 PROCEDURE — 97140 MANUAL THERAPY 1/> REGIONS: CPT | Performed by: PHYSICAL THERAPIST

## 2022-10-11 NOTE — PROGRESS NOTES
Physical Therapy Daily Progress Note      Patient: Feliz Rueda   : 1955  Diagnosis/ICD-10 Code:  DDD (degenerative disc disease), cervical [M50.30]  Referring practitioner: Juan Bautista PA-C  Date of Initial Visit: Type: THERAPY  Noted: 2022  Today's Date: 10/11/2022  Patient seen for 7 sessions         Feliz Rueda reports: Used pole saw this weekend and had soreness in neck and back.   Feeling better today.     Objective   See Exercise, Manual, and Modality Logs for complete treatment.       Assessment/Plan    Tolerates manual therapy well this visit.  TTP at C5-C6, C6-C7 on right during STM.  Improved cervical mobility at end of visit.    Progress per Plan of Care           Manual Therapy:    30     mins  66897;  Therapeutic Exercise:    5     mins  07898;     Neuromuscular Mackenzie:        mins  37101;    Therapeutic Activity:          mins  57222;     Gait Training:           mins  12404;     Ultrasound:          mins  13769;    Electrical Stimulation:         mins  21125 ( );  Dry Needling          mins self-pay    Timed Treatment:   35   mins   Total Treatment:     35   mins    Jose David Chapin PT  Physical Therapist

## 2022-10-17 RX ORDER — ESCITALOPRAM OXALATE 10 MG/1
TABLET ORAL
Qty: 90 TABLET | Refills: 3 | Status: SHIPPED | OUTPATIENT
Start: 2022-10-17 | End: 2023-02-14 | Stop reason: SDUPTHER

## 2022-10-19 ENCOUNTER — TREATMENT (OUTPATIENT)
Dept: PHYSICAL THERAPY | Facility: CLINIC | Age: 67
End: 2022-10-19

## 2022-10-19 DIAGNOSIS — M50.30 DDD (DEGENERATIVE DISC DISEASE), CERVICAL: Primary | ICD-10-CM

## 2022-10-19 PROCEDURE — 97140 MANUAL THERAPY 1/> REGIONS: CPT | Performed by: PHYSICAL THERAPIST

## 2022-10-19 PROCEDURE — 97110 THERAPEUTIC EXERCISES: CPT | Performed by: PHYSICAL THERAPIST

## 2022-10-19 NOTE — PROGRESS NOTES
Physical Therapy Daily Progress Note    Patient: Feliz Rueda   : 1955  Diagnosis/ICD-10 Code:  DDD (degenerative disc disease), cervical [M50.30]  Referring practitioner: Juan Bautista PA-C  Date of Initial Visit: Type: THERAPY  Noted: 2022  Today's Date: 10/19/2022  Patient seen for 8 sessions         Feliz Rueda reports:  Neck pain has been low over the past week, however been limited by some lower back pain.  Reports feeling well after previous PT treatment.    Objective   See Exercise, Manual, and Modality Logs for complete treatment.       Assessment/Plan     Tolerates manual therapy well.  Limitation noted with left cervical rotation this visit.  Hypomobility also noted within thoracic spine with GASPER ross.  Feeling well after treatment.     Progress per Plan of Care           Manual Therapy:    30     mins  21382;  Therapeutic Exercise:   8     mins  50033;     Neuromuscular Mackenzie:        mins  65622;    Therapeutic Activity:          mins  27524;     Gait Training:           mins  75056;     Ultrasound:          mins  84522;    Electrical Stimulation:         mins  58598 ( );  Dry Needling          mins self-pay    Timed Treatment:   38   mins   Total Treatment:     38   mins    Jose David Chapin PT  Physical Therapist

## 2022-10-25 ENCOUNTER — TELEPHONE (OUTPATIENT)
Dept: FAMILY MEDICINE CLINIC | Facility: CLINIC | Age: 67
End: 2022-10-25

## 2022-10-25 ENCOUNTER — TREATMENT (OUTPATIENT)
Dept: PHYSICAL THERAPY | Facility: CLINIC | Age: 67
End: 2022-10-25

## 2022-10-25 DIAGNOSIS — M50.30 DDD (DEGENERATIVE DISC DISEASE), CERVICAL: Primary | ICD-10-CM

## 2022-10-25 PROCEDURE — 97110 THERAPEUTIC EXERCISES: CPT | Performed by: PHYSICAL THERAPIST

## 2022-10-25 PROCEDURE — 97140 MANUAL THERAPY 1/> REGIONS: CPT | Performed by: PHYSICAL THERAPIST

## 2022-10-25 NOTE — PROGRESS NOTES
Physical Therapy Daily Progress Note    Patient: Feliz Rueda   : 1955  Diagnosis/ICD-10 Code:  DDD (degenerative disc disease), cervical [M50.30]  Referring practitioner: Juan Bautista PA-C  Date of Initial Visit: Type: THERAPY  Noted: 2022  Today's Date: 10/25/2022  Patient seen for 9 sessions         Feliz Rueda reports: Neck pain remains decreased over the past week.  Reports intermittent stiffness, but overall feeling better.     Objective   See Exercise, Manual, and Modality Logs for complete treatment.       Assessment/Plan     Noted improved AROM in cervical spine.  Patient tolerates manual therapy well.  Patient to continue with HEP for neck, will be evaluated for lumbar spine next week per MD and patient request.     Progress per Plan of Care           Manual Therapy:    30     mins  08114;  Therapeutic Exercise:    10     mins  46171;     Neuromuscular Mackenzie:        mins  98176;    Therapeutic Activity:          mins  78056;     Gait Training:           mins  18252;     Ultrasound:          mins  23244;    Electrical Stimulation:         mins  29673 ( );  Dry Needling          mins self-pay    Timed Treatment:   40   mins   Total Treatment:     40   mins    Jose David Chapin PT  Physical Therapist

## 2022-10-25 NOTE — TELEPHONE ENCOUNTER
Rachel with  Physical Therapy at [facility] called to ask for an order to be put in [facility] for them to see patient for his lower back.

## 2022-10-28 ENCOUNTER — TELEPHONE (OUTPATIENT)
Dept: FAMILY MEDICINE CLINIC | Facility: CLINIC | Age: 67
End: 2022-10-28

## 2022-10-28 NOTE — TELEPHONE ENCOUNTER
Left detailed message on patient's voice mail at 4:14pm that he could not get the shingles vaccine here because Medicare does not cover it here.

## 2022-11-02 ENCOUNTER — TREATMENT (OUTPATIENT)
Dept: PHYSICAL THERAPY | Facility: CLINIC | Age: 67
End: 2022-11-02

## 2022-11-02 DIAGNOSIS — M54.50 CHRONIC RIGHT-SIDED LOW BACK PAIN WITHOUT SCIATICA: Primary | ICD-10-CM

## 2022-11-02 DIAGNOSIS — G89.29 CHRONIC RIGHT-SIDED LOW BACK PAIN WITHOUT SCIATICA: Primary | ICD-10-CM

## 2022-11-02 PROCEDURE — 97110 THERAPEUTIC EXERCISES: CPT | Performed by: PHYSICAL THERAPIST

## 2022-11-02 PROCEDURE — 97162 PT EVAL MOD COMPLEX 30 MIN: CPT | Performed by: PHYSICAL THERAPIST

## 2022-11-02 NOTE — PROGRESS NOTES
Physical Therapy Initial Evaluation and Plan of Care    Patient: Feliz Rueda   : 1955  Diagnosis/ICD-10 Code:  Chronic right-sided low back pain without sciatica [M54.50, G89.29]  Referring practitioner: Juan Bautista PA-C  Date of Initial Visit: 2022  Today's Date: 2022  Patient seen for 1 sessions           Subjective Questionnaire: Oswestry: 26%      Subjective Evaluation    History of Present Illness  Mechanism of injury: Patient presents to physical therapy with cc of lower back pain that has been present for the past 4-5 months.  Reports that he was getting out of bed one day and swung his arm around to generate momentum and felt pain in lower back.  Reports that with the treatment he was getting for his neck and the medication to mange that pain he feels that he aggravated the lower back with activity.  Denies pain in right hip or right leg.  Wishes to have less lower back pain with ADL's.    Pain  Current pain ratin  At worst pain rating: 10  Quality: dull ache  Relieving factors: medications and change in position  Aggravating factors: prolonged positioning, lifting, squatting and sleeping  Progression: no change    Patient Goals  Patient goals for therapy: decreased pain, return to sport/leisure activities and increased strength             Objective          Tenderness     Right Hip   Tenderness in the PSIS.     Active Range of Motion     Additional Active Range of Motion Details  Lumbar extension 25% limited     Strength/Myotome Testing     Left Hip   Planes of Motion   Flexion: 5  Extension: 4+  Abduction: 4+  External rotation: 5  Internal rotation: 5    Right Hip   Planes of Motion   Flexion: 5  Extension: 4+  Abduction: 4+  External rotation: 5  Internal rotation: 5    Left Knee   Flexion: 5  Extension: 5    Right Knee   Flexion: 5  Extension: 5    Left Ankle/Foot   Dorsiflexion: 5    Right Ankle/Foot   Dorsiflexion: 5    Tests     Lumbar     Right   Positive quadrant.            Assessment & Plan     Assessment  Impairments: abnormal or restricted ROM, impaired physical strength, lacks appropriate home exercise program and pain with function  Functional Limitations: carrying objects, lifting and uncomfortable because of pain  Assessment details: Patient presents to physical therapy with s/s of lower back pain.  Demonstrates limited AROM in lumbar spine, weakness in BLE's and pain in lower back with activity.  Patient is appropriate for PT intervention in order to address these deficits so that he may complete yard work and ADL's with less pain/limitation.   Prognosis: good    Goals  Plan Goals: In two weeks, patient will report at least 25% reduction in pain level.    In two weeks, patient will demonstrate at least 25% improvement in AROM in  lumbar spine.     In four weeks, patient will demonstrate lumbar AROM WFL without pain level over 2/10.  In four weeks, patient will demonstrate decreased perceived disability by decreasing score on Oswestry by at least 12%.  In four weeks, patient will report completing ADL's without pain level over 2/10.     Plan  Therapy options: will be seen for skilled therapy services  Planned modality interventions: cryotherapy, thermotherapy (hydrocollator packs) and traction  Planned therapy interventions: manual therapy, neuromuscular re-education, soft tissue mobilization, spinal/joint mobilization, strengthening, stretching, therapeutic activities, joint mobilization, home exercise program and abdominal trunk stabilization  Duration in weeks: 6  Plan details: 1-2 times per week        Manual Therapy:         mins  23445;  Therapeutic Exercise:    15     mins  36983;     Neuromuscular Mackenzie:        mins  03785;    Therapeutic Activity:          mins  01237;     Gait Training:           mins  40455;     Ultrasound:          mins  52036;    Electrical Stimulation:         mins  53061 ( );  Dry Needling          mins self-pay    Timed Treatment:    15   mins   Total Treatment:     45   mins    PT SIGNATURE: Jose David Chapin, PT   DATE TREATMENT INITIATED: 11/2/2022    Initial Certification  Certification Period: 1/31/2023  I certify that the therapy services are furnished while this patient is under my care.  The services outlined above are required by this patient, and will be reviewed every 90 days.     PHYSICIAN: Juan Bautista PA-C      DATE:     Please sign and return via fax to [phone].. Thank you, [facility].

## 2022-11-08 ENCOUNTER — TREATMENT (OUTPATIENT)
Dept: PHYSICAL THERAPY | Facility: CLINIC | Age: 67
End: 2022-11-08

## 2022-11-08 DIAGNOSIS — G89.29 CHRONIC RIGHT-SIDED LOW BACK PAIN WITHOUT SCIATICA: Primary | ICD-10-CM

## 2022-11-08 DIAGNOSIS — M54.50 CHRONIC RIGHT-SIDED LOW BACK PAIN WITHOUT SCIATICA: Primary | ICD-10-CM

## 2022-11-08 PROCEDURE — G0283 ELEC STIM OTHER THAN WOUND: HCPCS | Performed by: PHYSICAL THERAPIST

## 2022-11-08 PROCEDURE — 97140 MANUAL THERAPY 1/> REGIONS: CPT | Performed by: PHYSICAL THERAPIST

## 2022-11-08 PROCEDURE — 97110 THERAPEUTIC EXERCISES: CPT | Performed by: PHYSICAL THERAPIST

## 2022-11-08 NOTE — PROGRESS NOTES
Physical Therapy Daily Progress Note    Patient: Feliz Rueda   : 1955  Diagnosis/ICD-10 Code:  Chronic right-sided low back pain without sciatica [M54.50, G89.29]  Referring practitioner: Juan Bautista PA-C  Date of Initial Visit: Type: THERAPY  Noted: 2022  Today's Date: 2022  Patient seen for 2 sessions         Feliz Rueda reports: Lower back was very sore after doing positional distraction at home.  Has been managing pain with medication over the past few days.     Objective   See Exercise, Manual, and Modality Logs for complete treatment.       Assessment/Plan     Felt better after modalities and change in manual therapy techniques.  Patient requires tactile cues for proper technique with PPT exercise.      Progress per Plan of Care           Manual Therapy:    10     mins  14590;  Therapeutic Exercise:    15     mins  15409;     Neuromuscular Mackenzie:        mins  33921;    Therapeutic Activity:          mins  98749;     Gait Training:           mins  38532;     Ultrasound:          mins  84558;    Electrical Stimulation:    15     mins  09063 ( );  Dry Needling          mins self-pay    Timed Treatment:   25   mins   Total Treatment:     40   mins    Jose David Chapin PT  Physical Therapist

## 2022-11-14 ENCOUNTER — TELEPHONE (OUTPATIENT)
Dept: FAMILY MEDICINE CLINIC | Facility: CLINIC | Age: 67
End: 2022-11-14

## 2022-11-14 NOTE — TELEPHONE ENCOUNTER
Caller: Feliz Rueda    Relationship: Self    Best call back number: [phone]    Who are you requesting to speak with (clinical staff, provider,  specific staff member): KEDAR CYR    What was the call regarding: PATIENT STATED HIS PHARMACY, [facility] - [address] [phone] [facility] [phone] [facility] HAS BEEN TRYING TO REACH KEDAR CYR WITH A QUESTION REGARDING THE MEDICATION  cyclobenzaprine (FLEXERIL) 10 MG tablet, BUT HAVE BEEN UNABLE TO GET A RESPONSE.    PATIENT STATED HIS PHARMACY WILL NOT FILL THIS MEDICATION UNTIL THEY CAN GET IN CONTACT WITH KEDAR CYR.    Do you require a callback: PLEASE CALL THE PHARMACY BACK [phone].

## 2022-11-15 ENCOUNTER — TREATMENT (OUTPATIENT)
Dept: PHYSICAL THERAPY | Facility: CLINIC | Age: 67
End: 2022-11-15

## 2022-11-15 DIAGNOSIS — G89.29 CHRONIC RIGHT-SIDED LOW BACK PAIN WITHOUT SCIATICA: Primary | ICD-10-CM

## 2022-11-15 DIAGNOSIS — M54.50 CHRONIC RIGHT-SIDED LOW BACK PAIN WITHOUT SCIATICA: Primary | ICD-10-CM

## 2022-11-15 PROCEDURE — 97110 THERAPEUTIC EXERCISES: CPT | Performed by: PHYSICAL THERAPIST

## 2022-11-15 PROCEDURE — 97140 MANUAL THERAPY 1/> REGIONS: CPT | Performed by: PHYSICAL THERAPIST

## 2022-11-15 RX ORDER — CYCLOBENZAPRINE HCL 10 MG
10 TABLET ORAL 3 TIMES DAILY PRN
Qty: 90 TABLET | Refills: 1 | Status: SHIPPED | OUTPATIENT
Start: 2022-11-15 | End: 2022-11-17 | Stop reason: SDUPTHER

## 2022-11-15 NOTE — PROGRESS NOTES
Physical Therapy Daily Progress Note    Patient: Feliz Rueda   : 1955  Diagnosis/ICD-10 Code:  Chronic right-sided low back pain without sciatica [M54.50, G89.29]  Referring practitioner: Juan Bautista PA-C  Date of Initial Visit: Type: THERAPY  Noted: 2022  Today's Date: 11/15/2022  Patient seen for 3 sessions         Feliz Rueda reports:  Lower back felt better after last visit, however flared-up over the weekend.  Patient reports pain in lower back and right posterior hip.    Objective   See Exercise, Manual, and Modality Logs for complete treatment.       Assessment/Plan     Tolerates manual therapy well this visit.  Patient demonstrates proper technique with exercise as well.  Progressing well towards goals.     Progress per Plan of Care           Manual Therapy:    30     mins  85722;  Therapeutic Exercise:    10     mins  63123;     Neuromuscular Mackenzie:        mins  23433;    Therapeutic Activity:          mins  33118;     Gait Training:           mins  87278;     Ultrasound:          mins  84977;    Electrical Stimulation:         mins  96315 ( );  Dry Needling          mins self-pay    Timed Treatment:   40   mins   Total Treatment:     40   mins    Jose David Chapin PT  Physical Therapist

## 2022-11-17 ENCOUNTER — TELEPHONE (OUTPATIENT)
Dept: FAMILY MEDICINE CLINIC | Facility: CLINIC | Age: 67
End: 2022-11-17

## 2022-11-17 RX ORDER — CYCLOBENZAPRINE HCL 10 MG
10 TABLET ORAL 3 TIMES DAILY PRN
Qty: 90 TABLET | Refills: 1 | Status: SHIPPED | OUTPATIENT
Start: 2022-11-17 | End: 2023-02-14 | Stop reason: SDUPTHER

## 2022-11-17 NOTE — TELEPHONE ENCOUNTER
Caller: Mohit Feliz JOSE    Relationship: Self    Best call back number: [phone] (Mobile)    Requested Prescriptions:   cyclobenzaprine (FLEXERIL) 10 MG tablet     Pharmacy where request should be sent:  [facility] - [address] [phone] [facility] [phone] [facility]        Additional details provided by patient: PATIENT CALLED TO ADVISE THAT A PRIOR AUTHORIZATION IS NEEDED FOR THIS MEDICATION.     Does the patient have less than a 3 day supply:  [] Yes  [] No    Rah Abraham Rep   11/17/22 10:20 EST         THANKS    Denies

## 2022-11-22 ENCOUNTER — TREATMENT (OUTPATIENT)
Dept: PHYSICAL THERAPY | Facility: CLINIC | Age: 67
End: 2022-11-22

## 2022-11-22 DIAGNOSIS — M50.30 DDD (DEGENERATIVE DISC DISEASE), CERVICAL: ICD-10-CM

## 2022-11-22 DIAGNOSIS — G89.29 CHRONIC RIGHT-SIDED LOW BACK PAIN WITHOUT SCIATICA: Primary | ICD-10-CM

## 2022-11-22 DIAGNOSIS — M54.50 CHRONIC RIGHT-SIDED LOW BACK PAIN WITHOUT SCIATICA: Primary | ICD-10-CM

## 2022-11-22 PROCEDURE — 97110 THERAPEUTIC EXERCISES: CPT | Performed by: PHYSICAL THERAPIST

## 2022-11-22 PROCEDURE — 97140 MANUAL THERAPY 1/> REGIONS: CPT | Performed by: PHYSICAL THERAPIST

## 2022-11-22 NOTE — PROGRESS NOTES
Physical Therapy Daily Progress Note    Patient: Feliz Rueda   : 1955  Diagnosis/ICD-10 Code:  Chronic right-sided low back pain without sciatica [M54.50, G89.29]  Referring practitioner: Juan Bautista PA-C  Date of Initial Visit: Type: THERAPY  Noted: 2022  Today's Date: 2022  Patient seen for 4 sessions         Feliz Rueda reports: Lower back feeling a little better.  Reports compliance with HEP.     Objective   See Exercise, Manual, and Modality Logs for complete treatment.       Assessment/Plan     Tolerates manual therapy well this visit.  Patient feeling well at end of visit.    Progress per Plan of Care           Manual Therapy:    30     mins  56370;  Therapeutic Exercise:    10     mins  18151;     Neuromuscular Mackenzie:        mins  30674;    Therapeutic Activity:          mins  05353;     Gait Training:           mins  09377;     Ultrasound:          mins  69046;    Electrical Stimulation:         mins  40700 ( );  Dry Needling          mins self-pay    Timed Treatment:   40   mins   Total Treatment:     40   mins    Jose David Chapin PT  Physical Therapist                       Patient information on fall and injury prevention

## 2022-11-29 ENCOUNTER — TREATMENT (OUTPATIENT)
Dept: PHYSICAL THERAPY | Facility: CLINIC | Age: 67
End: 2022-11-29

## 2022-11-29 DIAGNOSIS — M50.30 DDD (DEGENERATIVE DISC DISEASE), CERVICAL: ICD-10-CM

## 2022-11-29 DIAGNOSIS — G89.29 CHRONIC RIGHT-SIDED LOW BACK PAIN WITHOUT SCIATICA: Primary | ICD-10-CM

## 2022-11-29 DIAGNOSIS — M54.50 CHRONIC RIGHT-SIDED LOW BACK PAIN WITHOUT SCIATICA: Primary | ICD-10-CM

## 2022-11-29 PROCEDURE — 97140 MANUAL THERAPY 1/> REGIONS: CPT | Performed by: PHYSICAL THERAPIST

## 2022-11-29 NOTE — PROGRESS NOTES
Physical Therapy Daily Progress Note    Patient: Feliz Rueda   : 1955  Diagnosis/ICD-10 Code:  Chronic right-sided low back pain without sciatica [M54.50, G89.29]  Referring practitioner: Juan Bautista PA-C  Date of Initial Visit: Type: THERAPY  Noted: 2022  Today's Date: 2022  Patient seen for 5 sessions         Feliz Rueda reports: Lower back is sore after working in yard at home over the weekend.  Overall, less back pain since starting PT intervention.     Objective   See Exercise, Manual, and Modality Logs for complete treatment.       Assessment/Plan     Tolerate manual therapy and modalities well this visit.  Feeling better at end of visit.    Progress per Plan of Care           Manual Therapy:    38     mins  65233;  Therapeutic Exercise:    5     mins  80618;     Neuromuscular Mackenzie:        mins  56680;    Therapeutic Activity:          mins  91221;     Gait Training:           mins  45323;     Ultrasound:          mins  47571;    Electrical Stimulation:         mins  39519 ( );  Dry Needling          mins self-pay    Timed Treatment:   43   mins   Total Treatment:     43   mins    Jose David Chapin PT  Physical Therapist

## 2022-12-09 ENCOUNTER — OFFICE VISIT (OUTPATIENT)
Dept: FAMILY MEDICINE CLINIC | Facility: CLINIC | Age: 67
End: 2022-12-09

## 2022-12-09 VITALS
BODY MASS INDEX: 26.68 KG/M2 | OXYGEN SATURATION: 95 % | SYSTOLIC BLOOD PRESSURE: 117 MMHG | HEART RATE: 87 BPM | TEMPERATURE: 97.3 F | HEIGHT: 72 IN | RESPIRATION RATE: 16 BRPM | WEIGHT: 197 LBS | DIASTOLIC BLOOD PRESSURE: 86 MMHG

## 2022-12-09 DIAGNOSIS — L72.3 SEBACEOUS CYST: Primary | ICD-10-CM

## 2022-12-09 PROCEDURE — 99213 OFFICE O/P EST LOW 20 MIN: CPT | Performed by: PHYSICIAN ASSISTANT

## 2022-12-09 NOTE — PROGRESS NOTES
"Subjective   Feliz Rueda is a 67 y.o. male.     Chief Complaint   Patient presents with   • Mass       /86   Pulse 87   Temp 97.3 °F (36.3 °C)   Resp 16   Ht 182.9 cm (72\")   Wt 89.4 kg (197 lb)   SpO2 95%   BMI 26.72 kg/m²     BP Readings from Last 3 Encounters:   12/09/22 117/86   08/18/22 130/84   07/19/22 116/80       Wt Readings from Last 3 Encounters:   12/09/22 89.4 kg (197 lb)   08/18/22 83 kg (183 lb)   07/19/22 86.2 kg (190 lb)       HPI presents for skin lesion behind the left ear. Picked this- made a scab. Not a lot of drainage. Been present for a little over a week. No fever.     The following portions of the patient's history were reviewed and updated as appropriate: allergies, current medications, past family history, past medical history, past social history, past surgical history and problem list.    Review of Systems    Objective   Physical Exam  Constitutional:       Appearance: Normal appearance.   Eyes:      Extraocular Movements: Extraocular movements intact.      Pupils: Pupils are equal, round, and reactive to light.   Neurological:      General: No focal deficit present.      Mental Status: He is alert and oriented to person, place, and time.   Psychiatric:         Mood and Affect: Mood normal.         Behavior: Behavior normal.           Diagnoses and all orders for this visit:    1. Sebaceous cyst (Primary)      Warm wash cloth  Return if symptoms worsen or fail to improve.  "

## 2023-01-19 ENCOUNTER — OFFICE VISIT (OUTPATIENT)
Dept: FAMILY MEDICINE CLINIC | Facility: CLINIC | Age: 68
End: 2023-01-19
Payer: MEDICARE

## 2023-01-19 VITALS
TEMPERATURE: 98 F | HEART RATE: 105 BPM | DIASTOLIC BLOOD PRESSURE: 90 MMHG | RESPIRATION RATE: 20 BRPM | SYSTOLIC BLOOD PRESSURE: 134 MMHG | OXYGEN SATURATION: 95 % | BODY MASS INDEX: 26.63 KG/M2 | HEIGHT: 72 IN | WEIGHT: 196.6 LBS

## 2023-01-19 DIAGNOSIS — F43.11 ACUTE POSTTRAUMATIC STRESS SYNDROME: Primary | ICD-10-CM

## 2023-01-19 DIAGNOSIS — K58.1 IRRITABLE BOWEL SYNDROME WITH CONSTIPATION: ICD-10-CM

## 2023-01-19 DIAGNOSIS — F51.01 PRIMARY INSOMNIA: ICD-10-CM

## 2023-01-19 DIAGNOSIS — M50.20 HERNIATED CERVICAL DISC: ICD-10-CM

## 2023-01-19 PROCEDURE — 99214 OFFICE O/P EST MOD 30 MIN: CPT | Performed by: PHYSICIAN ASSISTANT

## 2023-01-19 NOTE — PROGRESS NOTES
"Subjective   Feliz Rueda is a 67 y.o. male.     Chief Complaint   Patient presents with   • 6 month well visit       /90   Pulse 105   Temp 98 °F (36.7 °C) (Infrared)   Resp 20   Ht 182.9 cm (72.01\")   Wt 89.2 kg (196 lb 9.6 oz)   SpO2 95%   BMI 26.66 kg/m²     BP Readings from Last 3 Encounters:   01/19/23 134/90   12/09/22 117/86   08/18/22 130/84       Wt Readings from Last 3 Encounters:   01/19/23 89.2 kg (196 lb 9.6 oz)   12/09/22 89.4 kg (197 lb)   08/18/22 83 kg (183 lb)       HPI Presents to the clinic for management of anxiety, ptsd, bph, insomnia, ibs-c. He has been doing well. He has had some intermittent neck pain that flairs up. Improved now. No chest pain soa. Anxiety well controlled. He is sleeping well. He had a sebaceous cyst but this has improved.     The following portions of the patient's history were reviewed and updated as appropriate: allergies, current medications, past family history, past medical history, past social history, past surgical history and problem list.    Review of Systems    Objective   Physical Exam  Constitutional:       Appearance: He is well-developed.   HENT:      Head: Normocephalic and atraumatic.   Eyes:      Conjunctiva/sclera: Conjunctivae normal.      Pupils: Pupils are equal, round, and reactive to light.   Cardiovascular:      Rate and Rhythm: Normal rate and regular rhythm.      Heart sounds: No murmur heard.  Pulmonary:      Effort: Pulmonary effort is normal.      Breath sounds: Normal breath sounds.   Abdominal:      General: Bowel sounds are normal.      Palpations: Abdomen is soft.      Tenderness: There is no abdominal tenderness.   Musculoskeletal:         General: No deformity. Normal range of motion.      Cervical back: Normal range of motion and neck supple.   Lymphadenopathy:      Cervical: No cervical adenopathy.   Skin:     General: Skin is warm and dry.      Capillary Refill: Capillary refill takes less than 2 seconds.      Findings: " No rash.   Neurological:      Mental Status: He is alert and oriented to person, place, and time.      Cranial Nerves: No cranial nerve deficit.   Psychiatric:         Behavior: Behavior normal.         Thought Content: Thought content normal.         Judgment: Judgment normal.           Diagnoses and all orders for this visit:    1. Acute posttraumatic stress syndrome (Primary)  Comments:  continue current dose of the medication.     2. Irritable bowel syndrome with constipation  Comments:  linzess is doing great.     3. Primary insomnia  Comments:  continue trazodone     4. Herniated cervical disc  Comments:  improved.         Return in about 6 months (around 7/19/2023), or if symptoms worsen or fail to improve.

## 2023-01-30 DIAGNOSIS — F43.10 PTSD (POST-TRAUMATIC STRESS DISORDER): ICD-10-CM

## 2023-01-30 RX ORDER — ALPRAZOLAM 1 MG/1
1 TABLET ORAL 3 TIMES DAILY PRN
Qty: 90 TABLET | Refills: 1 | Status: SHIPPED | OUTPATIENT
Start: 2023-01-30 | End: 2023-03-23 | Stop reason: SDUPTHER

## 2023-01-30 NOTE — TELEPHONE ENCOUNTER
Caller: Mohit Feliz JOSE    Relationship: Self    Best call back number: [phone]    Requested Prescriptions:   Requested Prescriptions     Pending Prescriptions Disp Refills   • ALPRAZolam (XANAX) 1 MG tablet 270 tablet 1        Pharmacy where request should be sent: [facility] - [place], [place] - [address] [facility] [phone] [facility] [phone] [facility]     Additional details provided by patient: PATIENT HAS A 4 DAY SUPPLY OF MEDICATION.    Does the patient have less than a 3 day supply:  [] Yes  [x] No    Would you like a call back once the refill request has been completed: [x] Yes [] No    If the office needs to give you a call back, can they leave a voicemail: [x] Yes [] No    PLEASE ADVISE.    Rah Linton Rep   01/30/23 10:13 EST

## 2023-02-14 ENCOUNTER — OFFICE VISIT (OUTPATIENT)
Dept: FAMILY MEDICINE CLINIC | Facility: CLINIC | Age: 68
End: 2023-02-14
Payer: MEDICARE

## 2023-02-14 VITALS
TEMPERATURE: 97.3 F | HEIGHT: 72 IN | BODY MASS INDEX: 26.19 KG/M2 | WEIGHT: 193.4 LBS | HEART RATE: 84 BPM | RESPIRATION RATE: 18 BRPM | DIASTOLIC BLOOD PRESSURE: 83 MMHG | SYSTOLIC BLOOD PRESSURE: 133 MMHG | OXYGEN SATURATION: 97 %

## 2023-02-14 DIAGNOSIS — L82.1 KERATOSIS, SEBORRHEIC: Primary | ICD-10-CM

## 2023-02-14 PROCEDURE — 99213 OFFICE O/P EST LOW 20 MIN: CPT | Performed by: PHYSICIAN ASSISTANT

## 2023-02-14 RX ORDER — CYCLOBENZAPRINE HCL 10 MG
10 TABLET ORAL 3 TIMES DAILY PRN
Qty: 90 TABLET | Refills: 1 | Status: SHIPPED | OUTPATIENT
Start: 2023-02-14

## 2023-02-14 RX ORDER — ESCITALOPRAM OXALATE 10 MG/1
10 TABLET ORAL DAILY
Qty: 90 TABLET | Refills: 3 | Status: SHIPPED | OUTPATIENT
Start: 2023-02-14

## 2023-02-14 NOTE — PROGRESS NOTES
"Subjective   Feliz Rueda is a 67 y.o. male.     Chief Complaint   Patient presents with   • spots on back     Dark spots on back that have been there for years that have recently changed and have gotten bigger       /83   Pulse 84   Temp 97.3 °F (36.3 °C) (Infrared)   Resp 18   Ht 182.9 cm (72.01\")   Wt 87.7 kg (193 lb 6.4 oz)   SpO2 97%   BMI 26.22 kg/m²     BP Readings from Last 3 Encounters:   02/14/23 133/83   01/19/23 134/90   12/09/22 117/86       Wt Readings from Last 3 Encounters:   02/14/23 87.7 kg (193 lb 6.4 oz)   01/19/23 89.2 kg (196 lb 9.6 oz)   12/09/22 89.4 kg (197 lb)       HPI Presents to the clinic for skin lesions on back. 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 Feels like they have gotten larger. No itching or bleeding. Never seen derm.     The following portions of the patient's history were reviewed and updated as appropriate: allergies, current medications, past family history, past medical history, past social history, past surgical history and problem list.    Review of Systems    Objective   Physical Exam  Constitutional:       Appearance: Normal appearance.   Eyes:      Extraocular Movements: Extraocular movements intact.      Pupils: Pupils are equal, round, and reactive to light.   Skin:     Findings: Lesion (3 stuck on appearing darker flat lesions on the back ) present.   Neurological:      General: No focal deficit present.      Mental Status: He is alert and oriented to person, place, and time.   Psychiatric:         Mood and Affect: Mood normal.         Behavior: Behavior normal.           Diagnoses and all orders for this visit:    1. Keratosis, seborrheic (Primary)  Comments:  discussed him seeing derm just for a skin exam. he would like to hold off. we will recheck lesions in july at next appointment.         Return if symptoms worsen or fail to improve.  "

## 2023-03-08 RX ORDER — DICLOFENAC SODIUM 75 MG/1
TABLET, DELAYED RELEASE ORAL
Qty: 180 TABLET | Refills: 3 | Status: SHIPPED | OUTPATIENT
Start: 2023-03-08

## 2023-03-20 RX ORDER — LINACLOTIDE 145 UG/1
CAPSULE, GELATIN COATED ORAL
Qty: 90 CAPSULE | Refills: 1 | Status: SHIPPED | OUTPATIENT
Start: 2023-03-20

## 2023-03-23 DIAGNOSIS — F43.10 PTSD (POST-TRAUMATIC STRESS DISORDER): ICD-10-CM

## 2023-03-23 RX ORDER — ALPRAZOLAM 1 MG/1
1 TABLET ORAL 3 TIMES DAILY PRN
Qty: 90 TABLET | Refills: 1 | Status: SHIPPED | OUTPATIENT
Start: 2023-03-23

## 2023-03-23 NOTE — TELEPHONE ENCOUNTER
Caller: Feliz Rueda    Relationship: Self    Best call back number: [phone]    Requested Prescriptions:   Requested Prescriptions     Pending Prescriptions Disp Refills   • ALPRAZolam (XANAX) 1 MG tablet 90 tablet 1     Sig: Take 1 tablet by mouth 3 (Three) Times a Day As Needed for Anxiety.        Pharmacy where request should be sent: [facility][id number] - [place], [place] - [address] [facility] [address] [facility] - [phone] [facility] [phone] FX     Last office visit with prescribing clinician: 2/14/2023   Last telemedicine visit with prescribing clinician: 7/21/2023   Next office visit with prescribing clinician: 7/21/2023     Additional details provided by patient: PATIENT HAS 4 TO 5 TABLETS LEFT.     PATIENT IS REQUESTING A 90 DAY SUPPLY.     Does the patient have less than a 3 day supply:  [x] Yes  [] No    Would you like a call back once the refill request has been completed: [] Yes [x] No    If the office needs to give you a call back, can they leave a voicemail: [] Yes [x] No    Rah Almendarez Rep   03/23/23 12:19 EDT

## 2023-04-17 RX ORDER — FENOFIBRATE 160 MG/1
160 TABLET ORAL DAILY
Qty: 90 TABLET | Refills: 3 | Status: SHIPPED | OUTPATIENT
Start: 2023-04-17 | End: 2023-04-18 | Stop reason: SDUPTHER

## 2023-04-18 ENCOUNTER — TELEPHONE (OUTPATIENT)
Dept: FAMILY MEDICINE CLINIC | Facility: CLINIC | Age: 68
End: 2023-04-18
Payer: MEDICARE

## 2023-04-18 RX ORDER — FENOFIBRATE 160 MG/1
160 TABLET ORAL DAILY
Qty: 90 TABLET | Refills: 3 | Status: SHIPPED | OUTPATIENT
Start: 2023-04-18

## 2023-04-18 NOTE — TELEPHONE ENCOUNTER
Caller: Feliz Rueda    Relationship: Self    Best call back number: [phone]    Do you require a callback: YES-PATIENT IS REQUESTING HIS REFILL FOR    fenofibrate 160 MG tablet      [facility]. HE HAS 18 PILLS LEFT.    [facility] ) - [place], [place] - [address] [phone] [facility] [phone]   [phone]

## 2023-04-25 RX ORDER — CYCLOBENZAPRINE HCL 10 MG
TABLET ORAL
Qty: 90 TABLET | Refills: 1 | Status: SHIPPED | OUTPATIENT
Start: 2023-04-25

## 2023-05-24 RX ORDER — TRAZODONE HYDROCHLORIDE 150 MG/1
TABLET ORAL
Qty: 90 TABLET | Refills: 3 | Status: SHIPPED | OUTPATIENT
Start: 2023-05-24

## 2023-06-02 DIAGNOSIS — F43.10 PTSD (POST-TRAUMATIC STRESS DISORDER): ICD-10-CM

## 2023-06-02 RX ORDER — ALPRAZOLAM 1 MG/1
1 TABLET ORAL 3 TIMES DAILY PRN
Qty: 90 TABLET | Refills: 1 | Status: SHIPPED | OUTPATIENT
Start: 2023-06-02

## 2023-06-02 NOTE — TELEPHONE ENCOUNTER
Caller: Feliz Rueda    Relationship: Self    Best call back number: [phone]    Requested Prescriptions:   Requested Prescriptions     Pending Prescriptions Disp Refills   • ALPRAZolam (XANAX) 1 MG tablet 90 tablet 1     Sig: Take 1 tablet by mouth 3 (Three) Times a Day As Needed for Anxiety.        Pharmacy where request should be sent: [id number] - [address] SEC OF [address] & [place] [address] - [phone] [place] [phone]      Last office visit with prescribing clinician: 2/14/2023   Last telemedicine visit with prescribing clinician: Visit date not found   Next office visit with prescribing clinician: 7/21/2023     Additional details provided by patient: PATIENT HAS A 1 DAY SUPPLY LEFT AND IS REQUESTING A 90 DAY SUPPLY.     Does the patient have less than a 3 day supply:  [x] Yes  [] No    Would you like a call back once the refill request has been completed: [] Yes [x] No    If the office needs to give you a call back, can they leave a voicemail: [] Yes [x] No    Rah Almendarez Rep   06/02/23 10:47 EDT

## 2023-07-06 ENCOUNTER — TELEPHONE (OUTPATIENT)
Dept: FAMILY MEDICINE CLINIC | Facility: CLINIC | Age: 68
End: 2023-07-06

## 2023-07-06 NOTE — TELEPHONE ENCOUNTER
Caller: Feliz Rueda    Relationship to patient: Self    Best call back number: [phone]    Chief complaint: SEVERE CONSTIPATION AND PAIN WHEN TRYING TO HAVE BOWEL MOVEMENT     Patient directed to call 911 or go to their nearest emergency room.     Patient verbalized understanding: [x] Yes  [] No  If no, why?    Additional notes: HUB OFFERED PATIENT SAME DAY APPOINTMENT, PATIENT FELT HE WOULD BE BETTER OFF GOING TO THE EMERGENCY ROOM

## 2023-08-02 DIAGNOSIS — F43.10 PTSD (POST-TRAUMATIC STRESS DISORDER): ICD-10-CM

## 2023-08-02 RX ORDER — ALPRAZOLAM 1 MG/1
1 TABLET ORAL 3 TIMES DAILY PRN
Qty: 90 TABLET | Refills: 1 | Status: SHIPPED | OUTPATIENT
Start: 2023-08-02

## 2023-08-03 ENCOUNTER — LAB (OUTPATIENT)
Dept: FAMILY MEDICINE CLINIC | Facility: CLINIC | Age: 68
End: 2023-08-03
Payer: MEDICARE

## 2023-08-03 ENCOUNTER — OFFICE VISIT (OUTPATIENT)
Dept: FAMILY MEDICINE CLINIC | Facility: CLINIC | Age: 68
End: 2023-08-03
Payer: MEDICARE

## 2023-08-03 VITALS
OXYGEN SATURATION: 97 % | RESPIRATION RATE: 16 BRPM | WEIGHT: 192 LBS | HEIGHT: 72 IN | BODY MASS INDEX: 26.01 KG/M2 | HEART RATE: 78 BPM | SYSTOLIC BLOOD PRESSURE: 124 MMHG | DIASTOLIC BLOOD PRESSURE: 80 MMHG

## 2023-08-03 DIAGNOSIS — E78.2 MIXED HYPERLIPIDEMIA: ICD-10-CM

## 2023-08-03 DIAGNOSIS — Z83.3 FAMILY HISTORY OF DIABETES MELLITUS IN FATHER: ICD-10-CM

## 2023-08-03 DIAGNOSIS — Z00.00 MEDICARE ANNUAL WELLNESS VISIT, INITIAL: ICD-10-CM

## 2023-08-03 DIAGNOSIS — E55.9 VITAMIN D DEFICIENCY: ICD-10-CM

## 2023-08-03 DIAGNOSIS — Z00.00 MEDICARE ANNUAL WELLNESS VISIT, INITIAL: Primary | ICD-10-CM

## 2023-08-03 PROCEDURE — 80053 COMPREHEN METABOLIC PANEL: CPT | Performed by: NURSE PRACTITIONER

## 2023-08-03 PROCEDURE — 85027 COMPLETE CBC AUTOMATED: CPT | Performed by: NURSE PRACTITIONER

## 2023-08-03 PROCEDURE — 80061 LIPID PANEL: CPT | Performed by: NURSE PRACTITIONER

## 2023-08-03 PROCEDURE — 83036 HEMOGLOBIN GLYCOSYLATED A1C: CPT | Performed by: NURSE PRACTITIONER

## 2023-08-03 PROCEDURE — 36415 COLL VENOUS BLD VENIPUNCTURE: CPT

## 2023-08-03 PROCEDURE — 82306 VITAMIN D 25 HYDROXY: CPT | Performed by: NURSE PRACTITIONER

## 2023-08-04 LAB
25(OH)D3 SERPL-MCNC: 42.2 NG/ML (ref 30–100)
ALBUMIN SERPL-MCNC: 4.2 G/DL (ref 3.5–5.2)
ALBUMIN/GLOB SERPL: 1.8 G/DL
ALP SERPL-CCNC: 48 U/L (ref 39–117)
ALT SERPL W P-5'-P-CCNC: 33 U/L (ref 1–41)
ANION GAP SERPL CALCULATED.3IONS-SCNC: 8.1 MMOL/L (ref 5–15)
AST SERPL-CCNC: 25 U/L (ref 1–40)
BILIRUB SERPL-MCNC: 0.3 MG/DL (ref 0–1.2)
BUN SERPL-MCNC: 18 MG/DL (ref 8–23)
BUN/CREAT SERPL: 16.4 (ref 7–25)
CALCIUM SPEC-SCNC: 9.9 MG/DL (ref 8.6–10.5)
CHLORIDE SERPL-SCNC: 105 MMOL/L (ref 98–107)
CHOLEST SERPL-MCNC: 162 MG/DL (ref 0–200)
CO2 SERPL-SCNC: 25.9 MMOL/L (ref 22–29)
CREAT SERPL-MCNC: 1.1 MG/DL (ref 0.76–1.27)
DEPRECATED RDW RBC AUTO: 41.5 FL (ref 37–54)
EGFRCR SERPLBLD CKD-EPI 2021: 74 ML/MIN/1.73
ERYTHROCYTE [DISTWIDTH] IN BLOOD BY AUTOMATED COUNT: 12 % (ref 12.3–15.4)
GLOBULIN UR ELPH-MCNC: 2.4 GM/DL
GLUCOSE SERPL-MCNC: 98 MG/DL (ref 65–99)
HBA1C MFR BLD: 6.1 % (ref 4.8–5.6)
HCT VFR BLD AUTO: 42.2 % (ref 37.5–51)
HDLC SERPL-MCNC: 26 MG/DL (ref 40–60)
HGB BLD-MCNC: 14.3 G/DL (ref 13–17.7)
LDLC SERPL CALC-MCNC: 102 MG/DL (ref 0–100)
LDLC/HDLC SERPL: 3.76 {RATIO}
MCH RBC QN AUTO: 32.3 PG (ref 26.6–33)
MCHC RBC AUTO-ENTMCNC: 33.9 G/DL (ref 31.5–35.7)
MCV RBC AUTO: 95.3 FL (ref 79–97)
PLATELET # BLD AUTO: 185 10*3/MM3 (ref 140–450)
PMV BLD AUTO: 9.5 FL (ref 6–12)
POTASSIUM SERPL-SCNC: 4.4 MMOL/L (ref 3.5–5.2)
PROT SERPL-MCNC: 6.6 G/DL (ref 6–8.5)
RBC # BLD AUTO: 4.43 10*6/MM3 (ref 4.14–5.8)
SODIUM SERPL-SCNC: 139 MMOL/L (ref 136–145)
TRIGL SERPL-MCNC: 191 MG/DL (ref 0–150)
VLDLC SERPL-MCNC: 34 MG/DL (ref 5–40)
WBC NRBC COR # BLD: 5.47 10*3/MM3 (ref 3.4–10.8)

## 2023-08-29 RX ORDER — LINACLOTIDE 145 UG/1
CAPSULE, GELATIN COATED ORAL
Qty: 90 CAPSULE | Refills: 3 | Status: SHIPPED | OUTPATIENT
Start: 2023-08-29

## 2023-10-03 DIAGNOSIS — F43.10 PTSD (POST-TRAUMATIC STRESS DISORDER): ICD-10-CM

## 2023-10-03 RX ORDER — ALPRAZOLAM 1 MG/1
1 TABLET ORAL 3 TIMES DAILY PRN
Qty: 90 TABLET | Refills: 1 | Status: SHIPPED | OUTPATIENT
Start: 2023-10-03

## 2023-10-03 NOTE — TELEPHONE ENCOUNTER
Caller: Feliz Rueda    Relationship: Self    Best call back number: [phone]    Requested Prescriptions:   Requested Prescriptions     Pending Prescriptions Disp Refills    ALPRAZolam (XANAX) 1 MG tablet 90 tablet 1     Sig: Take 1 tablet by mouth 3 (Three) Times a Day As Needed for Anxiety.        Pharmacy where request should be sent: [facility] #[id number] - [address] [facility] [address] [place] [address] - [phone] [place] [phone]      Last office visit with prescribing clinician: 2/14/2023   Last telemedicine visit with prescribing clinician: Visit date not found   Next office visit with prescribing clinician: 8/5/2024     Additional details provided by patient: PATIENT STATES THAT HE CALLED THIS IN TO [facility] THREE DAYS AGO. PATIENT IS OUT OF THIS.     Does the patient have less than a 3 day supply:  [x] Yes  [] No    Would you like a call back once the refill request has been completed: [] Yes [x] No    If the office needs to give you a call back, can they leave a voicemail: [] Yes [x] No    Rah Jaramillo Rep   10/03/23 09:44 EDT

## 2023-10-10 RX ORDER — CYCLOBENZAPRINE HCL 10 MG
TABLET ORAL
Qty: 90 TABLET | Refills: 1 | Status: SHIPPED | OUTPATIENT
Start: 2023-10-10

## 2024-01-30 RX ORDER — FENOFIBRATE 160 MG/1
160 TABLET ORAL DAILY
Qty: 90 TABLET | Refills: 1 | Status: SHIPPED | OUTPATIENT
Start: 2024-01-30

## 2024-01-30 RX ORDER — CYCLOBENZAPRINE HCL 10 MG
TABLET ORAL
Qty: 90 TABLET | Refills: 1 | Status: SHIPPED | OUTPATIENT
Start: 2024-01-30

## 2024-01-30 RX ORDER — ESCITALOPRAM OXALATE 10 MG/1
10 TABLET ORAL DAILY
Qty: 90 TABLET | Refills: 1 | Status: SHIPPED | OUTPATIENT
Start: 2024-01-30

## 2024-11-01 ENCOUNTER — TELEPHONE (OUTPATIENT)
Dept: FAMILY MEDICINE CLINIC | Facility: CLINIC | Age: 69
End: 2024-11-01
Payer: MEDICARE

## 2025-06-06 ENCOUNTER — APPOINTMENT (OUTPATIENT)
Dept: GENERAL RADIOLOGY | Facility: HOSPITAL | Age: 70
End: 2025-06-06
Payer: MEDICARE

## 2025-06-06 ENCOUNTER — HOSPITAL ENCOUNTER (OUTPATIENT)
Facility: HOSPITAL | Age: 70
Discharge: HOME | End: 2025-06-06
Attending: EMERGENCY MEDICINE
Payer: MEDICARE

## 2025-06-06 VITALS
HEIGHT: 70 IN | WEIGHT: 175.4 LBS | SYSTOLIC BLOOD PRESSURE: 155 MMHG | OXYGEN SATURATION: 98 % | BODY MASS INDEX: 25.11 KG/M2 | HEART RATE: 82 BPM | RESPIRATION RATE: 18 BRPM | DIASTOLIC BLOOD PRESSURE: 91 MMHG | TEMPERATURE: 99.4 F

## 2025-06-06 DIAGNOSIS — M25.411 PAIN AND SWELLING OF RIGHT SHOULDER: Primary | ICD-10-CM

## 2025-06-06 DIAGNOSIS — M25.511 PAIN AND SWELLING OF RIGHT SHOULDER: Primary | ICD-10-CM

## 2025-06-06 PROCEDURE — G0463 HOSPITAL OUTPT CLINIC VISIT: HCPCS | Performed by: NURSE PRACTITIONER

## 2025-06-06 PROCEDURE — 90471 IMMUNIZATION ADMIN: CPT | Performed by: NURSE PRACTITIONER

## 2025-06-06 PROCEDURE — 73030 X-RAY EXAM OF SHOULDER: CPT

## 2025-06-06 PROCEDURE — 99203 OFFICE O/P NEW LOW 30 MIN: CPT | Performed by: NURSE PRACTITIONER

## 2025-06-06 PROCEDURE — 25010000002 TETANUS-DIPHTH-ACELL PERTUSSIS 5-2.5-18.5 LF-MCG/0.5 SUSPENSION PREFILLED SYRINGE: Performed by: NURSE PRACTITIONER

## 2025-06-06 PROCEDURE — 90715 TDAP VACCINE 7 YRS/> IM: CPT | Performed by: NURSE PRACTITIONER

## 2025-06-06 RX ADMIN — TETANUS TOXOID, REDUCED DIPHTHERIA TOXOID AND ACELLULAR PERTUSSIS VACCINE, ADSORBED 0.5 ML: 5; 2.5; 8; 8; 2.5 SUSPENSION INTRAMUSCULAR at 19:00
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
## 2025-06-06 NOTE — Clinical Note
[facility] [place] [address] [place] [address] [place] [address]  Phone: [phone]    Feliz Rueda was seen and treated in our emergency department on 6/6/2025.  He may return to work on 06/09/2025.         Thank you for choosing [facility].    Rachelle Cavanaugh APRN

## 2025-06-06 NOTE — DISCHARGE INSTRUCTIONS
Follow-up with your primary care doctor.  Consider physical therapy for your shoulder pain.  There were no fractures or dislocations noted on imaging today.    Take Tylenol and ibuprofen as needed for pain.    He may soak with Epsom salt and warm water.    Use the sling as needed for comfort.

## 2025-07-16 ENCOUNTER — HOSPITAL ENCOUNTER (OUTPATIENT)
Dept: GENERAL RADIOLOGY | Facility: HOSPITAL | Age: 70
Discharge: HOME | End: 2025-07-16
Payer: MEDICARE

## 2025-07-16 ENCOUNTER — TRANSCRIBE ORDERS (OUTPATIENT)
Dept: ADMINISTRATIVE | Facility: HOSPITAL | Age: 70
End: 2025-07-16
Payer: MEDICARE

## 2025-07-16 ENCOUNTER — LAB (OUTPATIENT)
Dept: LAB | Facility: HOSPITAL | Age: 70
End: 2025-07-16
Payer: MEDICARE

## 2025-07-16 ENCOUNTER — HOSPITAL ENCOUNTER (OUTPATIENT)
Dept: CARDIOLOGY | Facility: HOSPITAL | Age: 70
Discharge: HOME | End: 2025-07-16
Payer: MEDICARE

## 2025-07-16 DIAGNOSIS — M75.101 TEAR OF RIGHT ROTATOR CUFF, UNSPECIFIED TEAR EXTENT, UNSPECIFIED WHETHER TRAUMATIC: ICD-10-CM

## 2025-07-16 DIAGNOSIS — M75.101 TEAR OF RIGHT ROTATOR CUFF, UNSPECIFIED TEAR EXTENT, UNSPECIFIED WHETHER TRAUMATIC: Primary | ICD-10-CM

## 2025-07-16 LAB
ALBUMIN SERPL-MCNC: 4.2 G/DL (ref 3.5–5.2)
ALBUMIN/GLOB SERPL: 1.8 G/DL
ALP SERPL-CCNC: 51 U/L (ref 39–117)
ALT SERPL W P-5'-P-CCNC: 20 U/L (ref 1–41)
ANION GAP SERPL CALCULATED.3IONS-SCNC: 12.3 MMOL/L (ref 5–15)
AST SERPL-CCNC: 22 U/L (ref 1–40)
BASOPHILS # BLD AUTO: 0.03 10*3/MM3 (ref 0–0.2)
BASOPHILS NFR BLD AUTO: 0.5 % (ref 0–1.5)
BILIRUB SERPL-MCNC: 0.2 MG/DL (ref 0–1.2)
BUN SERPL-MCNC: 23 MG/DL (ref 8–23)
BUN/CREAT SERPL: 20.7 (ref 7–25)
CALCIUM SPEC-SCNC: 9.5 MG/DL (ref 8.6–10.5)
CHLORIDE SERPL-SCNC: 106 MMOL/L (ref 98–107)
CO2 SERPL-SCNC: 22.7 MMOL/L (ref 22–29)
CREAT SERPL-MCNC: 1.11 MG/DL (ref 0.76–1.27)
DEPRECATED RDW RBC AUTO: 42.5 FL (ref 37–54)
EGFRCR SERPLBLD CKD-EPI 2021: 72.3 ML/MIN/1.73
EOSINOPHIL # BLD AUTO: 0.23 10*3/MM3 (ref 0–0.4)
EOSINOPHIL NFR BLD AUTO: 3.8 % (ref 0.3–6.2)
ERYTHROCYTE [DISTWIDTH] IN BLOOD BY AUTOMATED COUNT: 12 % (ref 12.3–15.4)
GLOBULIN UR ELPH-MCNC: 2.3 GM/DL
GLUCOSE SERPL-MCNC: 127 MG/DL (ref 65–99)
HCT VFR BLD AUTO: 40.8 % (ref 37.5–51)
HGB BLD-MCNC: 13.8 G/DL (ref 13–17.7)
IMM GRANULOCYTES # BLD AUTO: 0.05 10*3/MM3 (ref 0–0.05)
IMM GRANULOCYTES NFR BLD AUTO: 0.8 % (ref 0–0.5)
LYMPHOCYTES # BLD AUTO: 1.52 10*3/MM3 (ref 0.7–3.1)
LYMPHOCYTES NFR BLD AUTO: 24.9 % (ref 19.6–45.3)
MCH RBC QN AUTO: 32.9 PG (ref 26.6–33)
MCHC RBC AUTO-ENTMCNC: 33.8 G/DL (ref 31.5–35.7)
MCV RBC AUTO: 97.4 FL (ref 79–97)
MONOCYTES # BLD AUTO: 0.47 10*3/MM3 (ref 0.1–0.9)
MONOCYTES NFR BLD AUTO: 7.7 % (ref 5–12)
NEUTROPHILS NFR BLD AUTO: 3.8 10*3/MM3 (ref 1.7–7)
NEUTROPHILS NFR BLD AUTO: 62.3 % (ref 42.7–76)
NRBC BLD AUTO-RTO: 0 /100 WBC (ref 0–0.2)
PLATELET # BLD AUTO: 168 10*3/MM3 (ref 140–450)
PMV BLD AUTO: 9.9 FL (ref 6–12)
POTASSIUM SERPL-SCNC: 4.3 MMOL/L (ref 3.5–5.2)
PROT SERPL-MCNC: 6.5 G/DL (ref 6–8.5)
QT INTERVAL: 406 MS
QTC INTERVAL: 435 MS
RBC # BLD AUTO: 4.19 10*6/MM3 (ref 4.14–5.8)
SODIUM SERPL-SCNC: 141 MMOL/L (ref 136–145)
WBC NRBC COR # BLD AUTO: 6.1 10*3/MM3 (ref 3.4–10.8)

## 2025-07-16 PROCEDURE — 85025 COMPLETE CBC W/AUTO DIFF WBC: CPT

## 2025-07-16 PROCEDURE — 93005 ELECTROCARDIOGRAM TRACING: CPT | Performed by: ORTHOPAEDIC SURGERY

## 2025-07-16 PROCEDURE — 71046 X-RAY EXAM CHEST 2 VIEWS: CPT

## 2025-07-16 PROCEDURE — 36415 COLL VENOUS BLD VENIPUNCTURE: CPT

## 2025-07-16 PROCEDURE — 80053 COMPREHEN METABOLIC PANEL: CPT

## 2025-08-02 ENCOUNTER — HOSPITAL ENCOUNTER (EMERGENCY)
Facility: HOSPITAL | Age: 70
Discharge: HOME | End: 2025-08-02
Payer: MEDICARE

## 2025-08-02 VITALS
HEIGHT: 70 IN | SYSTOLIC BLOOD PRESSURE: 106 MMHG | TEMPERATURE: 98.2 F | BODY MASS INDEX: 24.87 KG/M2 | WEIGHT: 173.72 LBS | HEART RATE: 77 BPM | OXYGEN SATURATION: 94 % | DIASTOLIC BLOOD PRESSURE: 75 MMHG | RESPIRATION RATE: 18 BRPM

## 2025-08-02 DIAGNOSIS — M79.602 LEFT ARM PAIN: Primary | ICD-10-CM

## 2025-08-02 DIAGNOSIS — I80.9 THROMBOPHLEBITIS: ICD-10-CM

## 2025-08-02 PROCEDURE — 99282 EMERGENCY DEPT VISIT SF MDM: CPT

## 2025-08-02 NOTE — ED PROVIDER NOTES
Subjective   History of Present Illness  Chief complaint: left forearm pain      Context: 69 yom presents with c/o left forearm pain where his iv was after he had surgery on July 25.  He states he had a little red not appear where the IV was over his vein in his left forearm last night.  Denies any other complaints        PCP: anibal arenas        Review of Systems   Constitutional:  Negative for fever.       Past Medical History:   Diagnosis Date    Allergic     Anxiety     Arthritis     Benign prostatic hyperplasia     Chronic back pain     Chronic insomnia     GERD (gastroesophageal reflux disease)     Hyperlipidemia     Seizures        Allergies   Allergen Reactions    Codeine Nausea And Vomiting    Penicillin G Other (See Comments)       Past Surgical History:   Procedure Laterality Date    COLONOSCOPY      HERNIA REPAIR      TENNIS ELBOW RELEASE      VASECTOMY         Family History   Problem Relation Age of Onset    Diabetes Father     Arthritis Father     Heart disease Father     Hyperlipidemia Father        Social History     Socioeconomic History    Marital status:    Tobacco Use    Smoking status: Never    Smokeless tobacco: Never   Vaping Use    Vaping status: Never Used   Substance and Sexual Activity    Alcohol use: Yes    Drug use: No    Sexual activity: Defer           Objective   Physical Exam     Vital signs and traige nurse note reviewed.  Constitutional:  Awake, alert; well developed and well nourished.  No acute distress, the patient is examined in hospital gown.  HEENT:  Normocephalic, atraumatic;  with intact EOM; oropharynx is pink and moist without edema or erythema.  Neck: Supple, full range of motion without pain;   Cardiovascular: Regular rate and rhythm,    Pulmonary: Respiratory effort regular, nonlabored;   Musculoskeletal: Small 0.25 cm round erythematous area over the left dorsal mid forearm elbow pain with states IV was.  There is no pain over the deep venous system there is  no swelling of the arm.  +2 radial pulse.  Neuro: Alert oriented x3, speech is clear and appropriate.        Procedures           ED Course                                             Labs Reviewed - No data to display  Medications - No data to display  No radiology results for the last day  Prior to Admission medications    Medication Sig Start Date End Date Taking? Authorizing Provider   ALPRAZolam (XANAX) 1 MG tablet Take 1 tablet by mouth 3 (Three) Times a Day As Needed for Anxiety. 10/3/23   Juan Bautista PA-C   aspirin (aspirin) 81 MG EC tablet ADULT ASPIRIN EC LOW STRENGTH 81 MG ORAL TABLET DELAYED RELEASE 9/8/15   Liliana Cole MD   Chlorcyclizine-Pseudoephed (Stahist AD) 25-60 MG tablet TAKE 1 TABLET BY MOUTH TWICE DAILY 7/12/21   Juan Bautista PA-C   cyclobenzaprine (FLEXERIL) 10 MG tablet TAKE 1 TABLET BY MOUTH 3 TIMES  DAILY AS NEEDED FOR MUSCLE  SPASM(S) 1/30/24   Juan Bautista PA-C   diclofenac (VOLTAREN) 75 MG EC tablet TAKE 1 TABLET TWICE A DAY WITH  MEALS 3/8/23   Juan Bautista PA-C   docusate sodium (COLACE) 100 MG capsule 1 capsule Daily. 5/8/19   Liliana Cole MD   escitalopram (LEXAPRO) 10 MG tablet TAKE 1 TABLET BY MOUTH DAILY 1/30/24   Juan Bautista PA-C   fenofibrate 160 MG tablet TAKE 1 TABLET BY MOUTH DAILY 1/30/24   Juan Bautista PA-C   FIBER PO FIBER TABS 5/8/19   Liliana Cole MD   lansoprazole (PREVACID) 15 MG capsule Take 1 capsule by mouth Daily.    Liliana Cole MD   Linzess 145 MCG capsule capsule TAKE 1 CAPSULE BY MOUTH IN THE  MORNING BEFORE BREAKFAST 8/29/23   Juan Bautista PA-C   multivitamin with minerals tablet tablet Take 1 tablet by mouth Daily.    Liliana Cole MD   simvastatin (ZOCOR) 40 MG tablet TAKE 1 TABLET DAILY 7/17/23   Juan Bautista PA-C   tamsulosin (FLOMAX) 0.4 MG capsule 24 hr capsule Take 1 capsule by mouth Daily. 7/16/21   Juan Bautista PA-C   traZODone (DESYREL) 150 MG tablet  "TAKE 1 TABLET EVERY NIGHT 5/24/23   Juan Bautista PA-C   triamcinolone (KENALOG) 0.1 % cream TRIAMCINOLONE ACETONIDE 0.1 % CREA 5/18/17   Provider, MD Liliana               Medical Decision Making      /75   Pulse 77   Temp 98.2 °F (36.8 °C) (Oral)   Resp 18   Ht 177.8 cm (70\")   Wt 78.8 kg (173 lb 11.6 oz)   SpO2 94%   BMI 24.93 kg/m²           Radiology interpretation:   Ultrasound ordered            Appropriate PPE worn during exam.  Patient appears to have a superficial thrombophlebitis on exam.  Per Muslim policy ultrasound was unavailable at this time of the evening and Doppler was ordered for patient to come back in the morning.  Patient is agreeable to this.      i discussed findings with patient who voices understanding of discharge instructions, signs and symptoms requiring return to ED; discharged improved and in stable condition with follow up for re-evaluation.  This document is intended for medical expert use only. Reading of this document by patients and/or patient's family without participating medical staff guidance may result in misinterpretation and unintended morbidity.  Any interpretation of such data is the responsibility of the patient and/or family member responsible for the patient in concert with their primary or specialist providers, not to be left for sources of online searches such as dVisit, [employer] or similar queries. Relying on these approaches to knowledge may result in misinterpretation, misguided goals of care and even death should patients or family members try recommendations outside of the realm of professional medical care in a supervised inpatient environment.         Problems Addressed:  Left arm pain: acute illness or injury  Thrombophlebitis: acute illness or injury        Final diagnoses:   Left arm pain   Thrombophlebitis       ED Disposition  ED Disposition       ED Disposition   Discharge    Condition   Stable    Comment   --               Jordon, " Karen JOHNSTON MD  [address] [facility] [place] [address]  [phone]               Medication List      No changes were made to your prescriptions during this visit.            America Weinberg, APRN  08/02/25 2012

## 2025-08-02 NOTE — DISCHARGE INSTRUCTIONS
Outpatient Doppler was ordered, return between 8 AM and 10 AM to the radiology department for vascular testing.  Please on compresses.  Follow-up with your family doctor for results.  Return for any new or worsening symptoms

## 2025-08-03 ENCOUNTER — HOSPITAL ENCOUNTER (OUTPATIENT)
Dept: CARDIOLOGY | Facility: HOSPITAL | Age: 70
Discharge: HOME | End: 2025-08-03
Admitting: NURSE PRACTITIONER
Payer: MEDICARE

## 2025-08-03 DIAGNOSIS — I80.9 THROMBOPHLEBITIS: ICD-10-CM

## 2025-08-03 DIAGNOSIS — M79.602 LEFT ARM PAIN: ICD-10-CM

## 2025-08-03 LAB
BH CV UPPER VENOUS LEFT AXILLARY AUGMENT: NORMAL
BH CV UPPER VENOUS LEFT AXILLARY COMPRESS: NORMAL
BH CV UPPER VENOUS LEFT AXILLARY PHASIC: NORMAL
BH CV UPPER VENOUS LEFT AXILLARY SPONT: NORMAL
BH CV UPPER VENOUS LEFT BASILIC FOREARM COMPRESS: NORMAL
BH CV UPPER VENOUS LEFT BASILIC UPPER COMPRESS: NORMAL
BH CV UPPER VENOUS LEFT BRACHIAL COMPRESS: NORMAL
BH CV UPPER VENOUS LEFT CEPHALIC FOREARM COLOR: 1
BH CV UPPER VENOUS LEFT CEPHALIC FOREARM COMPRESS: NORMAL
BH CV UPPER VENOUS LEFT CEPHALIC FOREARM THROMBUS: NORMAL
BH CV UPPER VENOUS LEFT CEPHALIC UPPER COLOR: 1
BH CV UPPER VENOUS LEFT CEPHALIC UPPER COMPRESS: NORMAL
BH CV UPPER VENOUS LEFT CEPHALIC UPPER THROMBUS: NORMAL
BH CV UPPER VENOUS LEFT INTERNAL JUGULAR AUGMENT: NORMAL
BH CV UPPER VENOUS LEFT INTERNAL JUGULAR COMPRESS: NORMAL
BH CV UPPER VENOUS LEFT INTERNAL JUGULAR PHASIC: NORMAL
BH CV UPPER VENOUS LEFT INTERNAL JUGULAR SPONT: NORMAL
BH CV UPPER VENOUS LEFT RADIAL COMPRESS: NORMAL
BH CV UPPER VENOUS LEFT SUBCLAVIAN AUGMENT: NORMAL
BH CV UPPER VENOUS LEFT SUBCLAVIAN COMPRESS: NORMAL
BH CV UPPER VENOUS LEFT SUBCLAVIAN PHASIC: NORMAL
BH CV UPPER VENOUS LEFT SUBCLAVIAN SPONT: NORMAL
BH CV UPPER VENOUS LEFT ULNAR COMPRESS: NORMAL
BH CV UPPER VENOUS RIGHT INTERNAL JUGULAR AUGMENT: NORMAL
BH CV UPPER VENOUS RIGHT INTERNAL JUGULAR COMPRESS: NORMAL
BH CV UPPER VENOUS RIGHT INTERNAL JUGULAR PHASIC: NORMAL
BH CV UPPER VENOUS RIGHT INTERNAL JUGULAR SPONT: NORMAL
BH CV UPPER VENOUS RIGHT SUBCLAVIAN AUGMENT: NORMAL
BH CV UPPER VENOUS RIGHT SUBCLAVIAN COMPRESS: NORMAL
BH CV UPPER VENOUS RIGHT SUBCLAVIAN PHASIC: NORMAL
BH CV UPPER VENOUS RIGHT SUBCLAVIAN SPONT: NORMAL

## 2025-08-03 PROCEDURE — 93971 EXTREMITY STUDY: CPT

## 2025-08-03 PROCEDURE — 93971 EXTREMITY STUDY: CPT | Performed by: SURGERY

## 2025-08-19 ENCOUNTER — TELEPHONE (OUTPATIENT)
Dept: PHYSICAL THERAPY | Facility: CLINIC | Age: 70
End: 2025-08-19
Payer: MEDICARE

## 2025-08-27 ENCOUNTER — TREATMENT (OUTPATIENT)
Dept: PHYSICAL THERAPY | Facility: CLINIC | Age: 70
End: 2025-08-27
Payer: MEDICARE

## 2025-08-27 DIAGNOSIS — Z98.890 S/P RIGHT ROTATOR CUFF REPAIR: Primary | ICD-10-CM

## 2025-08-27 DIAGNOSIS — M25.532 PAIN IN LEFT WRIST: ICD-10-CM

## 2025-08-27 DIAGNOSIS — G89.29 CHRONIC RIGHT SHOULDER PAIN: ICD-10-CM

## 2025-08-27 DIAGNOSIS — M25.511 CHRONIC RIGHT SHOULDER PAIN: ICD-10-CM

## 2025-08-29 ENCOUNTER — TREATMENT (OUTPATIENT)
Dept: PHYSICAL THERAPY | Facility: CLINIC | Age: 70
End: 2025-08-29
Payer: MEDICARE

## 2025-08-29 DIAGNOSIS — G89.29 CHRONIC RIGHT SHOULDER PAIN: Primary | ICD-10-CM

## 2025-08-29 DIAGNOSIS — Z98.890 S/P RIGHT ROTATOR CUFF REPAIR: ICD-10-CM

## 2025-08-29 DIAGNOSIS — M25.511 CHRONIC RIGHT SHOULDER PAIN: Primary | ICD-10-CM

## 2025-08-29 DIAGNOSIS — M25.532 PAIN IN LEFT WRIST: ICD-10-CM
